# Patient Record
Sex: FEMALE | Race: WHITE | NOT HISPANIC OR LATINO | Employment: FULL TIME | URBAN - METROPOLITAN AREA
[De-identification: names, ages, dates, MRNs, and addresses within clinical notes are randomized per-mention and may not be internally consistent; named-entity substitution may affect disease eponyms.]

---

## 2017-08-24 ENCOUNTER — OFFICE VISIT (OUTPATIENT)
Dept: ORTHOPEDICS | Facility: CLINIC | Age: 77
End: 2017-08-24
Payer: COMMERCIAL

## 2017-08-24 ENCOUNTER — HOSPITAL ENCOUNTER (OUTPATIENT)
Dept: RADIOLOGY | Facility: HOSPITAL | Age: 77
Discharge: HOME | End: 2017-08-24
Attending: ORTHOPAEDIC SURGERY
Payer: MEDICARE

## 2017-08-24 VITALS — BODY MASS INDEX: 19.76 KG/M2 | WEIGHT: 133.38 LBS | HEIGHT: 69 IN

## 2017-08-24 DIAGNOSIS — G89.29 CHRONIC PAIN OF BOTH KNEES: Primary | ICD-10-CM

## 2017-08-24 DIAGNOSIS — M25.561 CHRONIC PAIN OF BOTH KNEES: ICD-10-CM

## 2017-08-24 DIAGNOSIS — M25.562 CHRONIC PAIN OF BOTH KNEES: Primary | ICD-10-CM

## 2017-08-24 DIAGNOSIS — M17.0 PRIMARY OSTEOARTHRITIS OF BOTH KNEES: ICD-10-CM

## 2017-08-24 DIAGNOSIS — M25.562 CHRONIC PAIN OF BOTH KNEES: ICD-10-CM

## 2017-08-24 DIAGNOSIS — M25.561 CHRONIC PAIN OF BOTH KNEES: Primary | ICD-10-CM

## 2017-08-24 DIAGNOSIS — G89.29 CHRONIC PAIN OF BOTH KNEES: ICD-10-CM

## 2017-08-24 PROCEDURE — 1125F AMNT PAIN NOTED PAIN PRSNT: CPT | Mod: ,,, | Performed by: ORTHOPAEDIC SURGERY

## 2017-08-24 PROCEDURE — 73562 X-RAY EXAM OF KNEE 3: CPT | Mod: TC,50

## 2017-08-24 PROCEDURE — 73562 X-RAY EXAM OF KNEE 3: CPT | Mod: 26,50,, | Performed by: RADIOLOGY

## 2017-08-24 PROCEDURE — 99999 PR PBB SHADOW E&M-NEW PATIENT-LVL II: CPT | Mod: PBBFAC,,, | Performed by: ORTHOPAEDIC SURGERY

## 2017-08-24 PROCEDURE — 99204 OFFICE O/P NEW MOD 45 MIN: CPT | Mod: 25,S$PBB,, | Performed by: ORTHOPAEDIC SURGERY

## 2017-08-24 PROCEDURE — 1159F MED LIST DOCD IN RCRD: CPT | Mod: ,,, | Performed by: ORTHOPAEDIC SURGERY

## 2017-08-24 RX ORDER — LEVOTHYROXINE SODIUM 50 UG/1
50 TABLET ORAL DAILY
COMMUNITY
End: 2023-04-10 | Stop reason: CLARIF

## 2017-08-24 RX ORDER — TRIAMCINOLONE ACETONIDE 40 MG/ML
40 INJECTION, SUSPENSION INTRA-ARTICULAR; INTRAMUSCULAR
Status: DISCONTINUED | OUTPATIENT
Start: 2017-08-24 | End: 2017-08-24 | Stop reason: HOSPADM

## 2017-08-24 RX ADMIN — TRIAMCINOLONE ACETONIDE 40 MG: 40 INJECTION, SUSPENSION INTRA-ARTICULAR; INTRAMUSCULAR at 11:08

## 2017-08-24 NOTE — PROCEDURES
Large Joint Aspiration/Injection  Date/Time: 8/24/2017 11:14 AM  Performed by: OCHSNER, JOHN L. JR  Authorized by: OCHSNER, JOHN L. JR     Consent Done?:  Yes (Verbal)  Indications:  Pain  Procedure site marked: Yes    Timeout: Prior to procedure the correct patient, procedure, and site was verified      Location:  Knee  Site:  R knee  Prep: Patient was prepped and draped in usual sterile fashion    Ultrasonic Guidance for needle placement: No  Needle size:  22 G  Approach:  Anterolateral  Medications:  40 mg triamcinolone acetonide 40 mg/mL  Patient tolerance:  Patient tolerated the procedure well with no immediate complications

## 2017-08-24 NOTE — PROGRESS NOTES
"CC:   bilateral knee pain  HPI:  77 y.o. yo female who presents with bilateral knee pain since 2017. States that there was no injury but one bay both oc her knees starting hurting. The pain is dull, constant and worst over the anterior knee. The pain has been progressive over the past month which is what brought her in today. She has tried oral anti inflammatories with moderate relief but has not had any injections into her knees. Denies locking, catching, swelling or erythema. No fevers or chills. Significantly she is leaving the country in a few days for 4 months in [place].     PAST MEDICAL HISTORY:   Past Medical History:   Diagnosis Date    H/O eye surgery     at age 8 (two done)    History of appendectomy     age 34 or 36    Hypothyroidism      PAST SURGICAL HISTORY:   Past Surgical History:   Procedure Laterality Date     SECTION      age 41     FAMILY HISTORY: History reviewed. No pertinent family history.  SOCIAL HISTORY:   Social History     Social History    Marital status:      Spouse name: N/A    Number of children: N/A    Years of education: N/A     Occupational History    Not on file.     Social History Main Topics    Smoking status: Former Smoker     Types: Cigarettes     Quit date: 1968    Smokeless tobacco: Never Used    Alcohol use Yes      Comment: socially (champagne)    Drug use: No    Sexual activity: Not on file     Other Topics Concern    Not on file     Social History Narrative    No narrative on file       MEDICATIONS:   Current Outpatient Prescriptions:     Lactobacillus rhamnosus GG (CULTURELLE) 10 billion cell capsule, Take 1 capsule by mouth once daily., Disp: , Rfl:     levothyroxine (SYNTHROID) 50 MCG tablet, Take 50 mcg by mouth once daily., Disp: , Rfl:   ALLERGIES: Review of patient's allergies indicates:  No Known Allergies    VITAL SIGNS: Ht 5' 8.5" (1.74 m)   Wt 60.5 kg (133 lb 6.1 oz)   BMI 19.99 kg/m²      Review of Systems "   Constitution: Negative. Negative for chills, fever and night sweats.   HENT: Negative for congestion and headaches.    Eyes: Negative for blurred vision, left vision loss and right vision loss.   Cardiovascular: Negative for chest pain and syncope.   Respiratory: Negative for cough and shortness of breath.    Endocrine: Negative for polydipsia, polyphagia and polyuria.   Hematologic/Lymphatic: Negative for bleeding problem. Does not bruise/bleed easily.   Skin: Negative for dry skin, itching and rash.   Musculoskeletal: Negative for falls and muscle weakness.   Gastrointestinal: Negative for abdominal pain and bowel incontinence.   Genitourinary: Negative for bladder incontinence and nocturia.   Neurological: Negative for disturbances in coordination, loss of balance and seizures.   Psychiatric/Behavioral: Negative for depression. The patient does not have insomnia.    Allergic/Immunologic: Negative for hives and persistent infections.       Physical Exam     General appearance    This is a well-developed, Well nourished patient No obvious acute distress.  Orientation: Patient is alert and oriented x4    Mood and affect: Normal, pleasant and conversant  Gait is coordinated. Patient can toe walk and heel walk without difficulty.    Cardiovascular: swelling or varicosities present.     Lymphatic: Negative for adenopathy     Deep Tendon Reflexes are normal; negative babinki  Coordination and Balance: Normal   Musculoskeletal     Back    ROM shows normal flexion, extension and rotation    Palpation shows no masses    Stability is normal    Strength to flexion and extension well maintained     Core strength is diminished    Skin shows no rashes or cafe au lait spots    Sensation intact to light touch     Left Lower Extremity    Alignment: wnl    ROM 0-105    Palpation shows no masses; erythema or swelling    Tenderness: TTP over medial joint line and patella. Patellofemoral crepitus    Hip ROM: No pain with forced  "internal rotation     Flexion:90     Internal Rotation:30     External Rotation:40    Knee stability: Stable to varus and valgus force. acl intact    Skin shows no rashes, lesions or cafe au lait spots    Sensation intact to light touch     Right Lower Extremity     Alignment: wnl    ROM 0-110    Palpation shows no masses, no erythema or swelling    Tenderness: TTP over patella. Some pain when stressing the medial joint line.    Hip ROM: No pain with forced internal rotation     Flexion:90     Internal Rotation:30     External Rotation:40    Knee stability: Stable to varus and valgus force, acl intact    Skin shows no rashes, lesions or cafe au lait spots    Sensation intact to light touch        Xrays:  X-rays of the bilateral knees are ordered and reviewed and my interpretation is as follows: Mild osteoarthritis in bilateral knees worst in the medial compartment. There is subchondral sclerosis but minimal joint space narrowing.    Assessment:  Encounter Diagnoses   Name Primary?    Chronic pain of both knees Yes       Plan:  - Patient would like to proceed with conservative management at this time.  - CSI into the right knee at this visit.  - Oral NSAID prn  - Continue with ICE, rest  - f/u prn  Right knee was injected.    Orders Placed This Encounter    X-ray Knee Ortho Bilateral               No results found for: HGBA1C  Estimated body mass index is 19.99 kg/m² as calculated from the following:    Height as of this encounter: 5' 8.5" (1.74 m).    Weight as of this encounter: 60.5 kg (133 lb 6.1 oz).          "

## 2018-03-01 ENCOUNTER — OFFICE VISIT (OUTPATIENT)
Dept: ORTHOPEDICS | Facility: CLINIC | Age: 78
End: 2018-03-01
Payer: MEDICARE

## 2018-03-01 VITALS — WEIGHT: 126 LBS | BODY MASS INDEX: 18.66 KG/M2 | HEIGHT: 69 IN

## 2018-03-01 DIAGNOSIS — M17.11 PRIMARY OSTEOARTHRITIS OF RIGHT KNEE: Primary | ICD-10-CM

## 2018-03-01 PROCEDURE — 99212 OFFICE O/P EST SF 10 MIN: CPT | Mod: PBBFAC | Performed by: ORTHOPAEDIC SURGERY

## 2018-03-01 PROCEDURE — 99213 OFFICE O/P EST LOW 20 MIN: CPT | Mod: S$PBB,,, | Performed by: ORTHOPAEDIC SURGERY

## 2018-03-01 PROCEDURE — 99999 PR PBB SHADOW E&M-EST. PATIENT-LVL II: CPT | Mod: PBBFAC,,, | Performed by: ORTHOPAEDIC SURGERY

## 2018-03-01 RX ORDER — TRETINOIN 0.5 MG/G
CREAM TOPICAL
Refills: 4 | COMMUNITY
Start: 2018-02-23 | End: 2023-04-11

## 2018-03-01 NOTE — PROGRESS NOTES
"HPI:    Qiana Nettles is a 78 y.o. female who is here today for   Chief Complaint   Patient presents with    Left Knee - Pain    Right Knee - Pain   .     Duration: 6 months  Intensity: severe  Character of pain: sharp  Location: She reports that the pain is predominately  lateral    Past Medical History:   Diagnosis Date    H/O eye surgery     at age 8 (two done)    History of appendectomy     age 34 or 36    History of removal of ovarian cyst     Hypothyroidism           Current Outpatient Prescriptions:     levothyroxine (SYNTHROID) 50 MCG tablet, Take 50 mcg by mouth once daily., Disp: , Rfl:     tretinoin (RETIN-A) 0.05 % cream, APPLY TO AFFECTED AREA AS DIRECTED, Disp: , Rfl: 4     Review of patient's allergies indicates:  No Known Allergies     ROS      Physical Exam:   Ht 5' 8.5" (1.74 m)   Wt 57.2 kg (126 lb)   BMI 18.88 kg/m²   General appearance: This is a well-developed, Well nourished female No obvious acute distress.  Psychiatric: normal mood and affect;  pleasant and conversant; judgment and thought content normal  Gait is coordinated. Patient has antalgic gait to the right  Cardiovascular: There are no swelling or varicosities present.   Respiratory: normal respiratory effort   Lymphatic: no adenopathy   Neurologic: alert and oriented to person, place, and time   Deep Tendon Reflexes are normal;  Coordination and Balance: Normal   Musculoskeletal   Neck    ROM shows normal flexion and extension and lateral rotation    Palpation: Non-tender    Stability is normal    Strength is normal    Skin is normal without masses and lesions    Sensation is intact to light touch   Back    ROM showsabnormal flexion, extension    and  rotation    Palpation shows no masses    Stability is normal    Strength to flexion and extension well maintained    Core strength is diminished    Skin shows no rashes or cafe au lait spots;     Sensation is intact to light touch    Right Knee  Swelling " Mild  TendernessLateral joint line  Range of Motion: 120    Left Knee: Swelling None  TendernessMedial joint line  Range of Motion: 120    Radiograph   Osteoarthritis: moderate  Angle: significant valgus, I think the patient is starting to wear the lateral tibial plateau.    Assessment: Osteoarthritis both knees.    Plan:  No treatment at this time.

## 2020-11-20 ENCOUNTER — RESEARCH ENCOUNTER (OUTPATIENT)
Dept: RESEARCH | Facility: CLINIC | Age: 80
End: 2020-11-20
Payer: MEDICARE

## 2020-11-20 VITALS
TEMPERATURE: 99 F | RESPIRATION RATE: 16 BRPM | BODY MASS INDEX: 19.72 KG/M2 | DIASTOLIC BLOOD PRESSURE: 71 MMHG | SYSTOLIC BLOOD PRESSURE: 121 MMHG | HEIGHT: 65 IN | WEIGHT: 118.38 LBS | HEART RATE: 62 BPM | OXYGEN SATURATION: 99 %

## 2020-11-20 DIAGNOSIS — Z23 NEED FOR VACCINATION: Primary | ICD-10-CM

## 2020-11-20 PROCEDURE — 0031A COVID-19,VECTOR-NR,RS-AD26,PF,0.5 ML DOSE VACCINE (JANSSEN): CPT | Mod: CV19,,, | Performed by: CLINICAL NURSE SPECIALIST

## 2020-11-20 PROCEDURE — 91303 COVID-19,VECTOR-NR,RS-AD26,PF,0.5 ML DOSE VACCINE (JANSSEN): CPT | Mod: ,,, | Performed by: CLINICAL NURSE SPECIALIST

## 2020-11-20 PROCEDURE — 91303 COVID-19,VECTOR-NR,RS-AD26,PF,0.5 ML DOSE VACCINE (JANSSEN): ICD-10-PCS | Mod: ,,, | Performed by: CLINICAL NURSE SPECIALIST

## 2020-11-20 PROCEDURE — 99499 UNLISTED E&M SERVICE: CPT | Mod: S$PBB,,, | Performed by: INTERNAL MEDICINE

## 2020-11-20 PROCEDURE — 99499 NO LOS: ICD-10-PCS | Mod: S$PBB,,, | Performed by: INTERNAL MEDICINE

## 2020-11-20 PROCEDURE — 0031A COVID-19,VECTOR-NR,RS-AD26,PF,0.5 ML DOSE VACCINE (JANSSEN): ICD-10-PCS | Mod: CV19,,, | Performed by: CLINICAL NURSE SPECIALIST

## 2020-11-20 NOTE — PROGRESS NOTES
Screening/Vaccination Visit (Day 1)   Sponsor: [employer] [facility]   Study: A Randomized, Double-blind, Placebo-controlled Phase 3 Study to Assess the Efficacy and Safety of Ad26.COV2.S for the Prevention of SARS-CoV-2-mediated COVID-19 in Adults Aged 18 Years and Older   IRB/Protocol #: 2020.275/ [id number]; Phase 3?   Principle Investigator/Sub-Investigator: MARS Ramírez  Site Number: [id number]  Location: [facility]  Subject Number: [id number]    Did the Day 1 Visit Occur at Screening visit?: Yes  Type of Contact: Site Visit     Informed Consent:   Consenting was completed in private area using the most current IRB approved version of the ICF by myself and patient was given ample time to review consent prior to execution of ICF.    Prior to the Informed Consent (IC) being signed, or any study protocol required data collection, testing, procedure, or intervention being performed, the following was done and/or discussed:?    Patient was given a copy of the IC for review??    Purpose of the study and qualifications to participate??    Study design, follow up schedule, and tests or procedures done at each visit?    Confidentiality and HIPAA Authorization for Release of Medical Records for the research trial/ subject's rights/research related injury?    Risk, Benefits, Alternative Treatments, Compensation and Costs?    Participation in the research trial is voluntary and patient may withdraw at anytime?    Contact information for study related questions?     Patient verbalizes understanding of the above: Yes?   Contact information for CRC and PI given to patient: Yes?   Patient able to adequately summarize: the purpose of the study, the risks associated with the study, and all procedures, testing, and follow-ups associated with the study: Yes?     Qiana Nettles signed the IRB approved version of informed consent form for the [employer] [facility] research study.? Each page of the  consent was reviewed with the patient and patient's family) and all questions answered satisfactorily. The patient signed the paper consent form and received a copy of same (copy of informed consent made and provided to patient). The original consent was scanned into electronic medical records (EPIC).    Time of Consent Execution: 13:40pm    I have provided the participant with a thermometer, pulse-oximeter, MA-COV form, nasal swab kit, and saliva recipients. Participant has been trained and educated on use of these study materials. Patient reports understanding on how/when to use study materials.    Vital Signs:  Vital signs were performed prior to blood draws and preceded by 5 or more minutes of rest in a quiet setting without distractions.  Were vital signs performed?: yes  Vitals obtained by: Javed Vargas  Date of collection: 20Nov2020  Time of collection: 13:50pm  Position: Sitting  Pulse: 62 beats/min  Systolic BP: 121 mmHg  Diastolic BP: 71 mmHg  Respiratory Rate: 16 breaths/min  All vitals were within normal limits.    Body Temperature:  Was body temperature collected?: yes  Collected by: Javed Vargas  Date of collection: 20-NOV-2020  Time of collection: 13:50pm  Temperature: 98.7 F  Temperature Anatomical Location: Oral    Oxygen Saturation:  Was Oxygen Saturation collected?: yes  Collected by: Javed Vargas  Date of collection: 20-NOV-2020  Time of collection: 13:50pm  Oxygen Saturation: 99 %  Subject's condition of oxygen saturation measurement: room air  Oxygen Saturation is within normal limits.    Body Weight and Height:  Was body weight and height collected?: yes  Collected by: Javed Vargas  Date of collection: 20-NOV-2020  Height: 165 cm  Weight: 53.7 kg  BMI: 19.72 kg/m2    Pregnancy Test:  Was the sample collected?: N/A  Sample collected by: Javed Vargas  Date of collection:   Time of collection:   Specimen Type: Urine  Pregnancy Test Result:     Inclusion/Exclusion  Criteria:  Inclusion Criteria (all must be YES to qualify):  1. Criterion modified per Amendment 1:  1.1 Participant must provide consent indicating that he or she understands the purpose, procedures and potential risks and benefits of the study, and is willing to participate in the study.   yes  2. Participant is willing and able to adhere to the prohibitions and restrictions specified in this protocol.   yes  3. Stages 1a and 1b: Participant is ?18 to <60 years of age on the day of signing the ICF.   N/A  Stages 2a and 2b: Participant is ?60 years of age on the day of signing the ICF.  Yes  4. Criterion modified per Amendment 1:  4.1 Criterion modified per Amendment 2:  4.2 Stages 1a and 2a: In the investigator's clinical judgement, participant must be either in good or stable health, including a BMI <30 kg/m2.     Participants may have underlying illnesses (not associated with increased risk of progression to severe COVID-19, as specified in Exclusion Criterion 15), as long as their symptoms and signs are stable and well-controlled. If participants are on medication for a condition not part of the comorbidities listed in Exclusion Criterion 15, the medication dose cannot have been increased within 12 weeks preceding vaccination and expected to remain stable for the duration of the study. Participants will be included on the basis of relevant medical history and BMI measurement at screening.   N/A    Stages 1b and 2b: In the investigator's clinical judgement, participant may have a stable and well-controlled comorbidity associated with an increased risk of progression to severe COVID-19 as specified in Exclusion Criterion 15 (eg, stable/well-controlled HIV infection)*. If participants are on medication for a comorbidity associated with an increased risk of progression to severe COVID-19, the medication dose cannot have been increased within 12 weeks preceding vaccination and must be expected to remain stable for  the duration of the study. Participants will be included on the basis of relevant medical history and BMI measurement at screening.   * Stable/well-controlled HIV infection includes:  a. CD4 cell count ?300 cells/?L.  b. HIV viral load <50 copies/mL.  c. Participant must be on a stable anti-retroviral treatment (ART) for 6 months (unless the change is due to tolerability, in which case the regimen can be for only the previous 3 months; changes in formulation are allowed) and the participant must be willing to continue his/her ART throughout the study as directed by his/her local physician.    NOTE: Participants with ongoing and progressive comorbidities associated with HIV infection will be excluded but comorbidities associated with HIV infection that have been clinically stable for the past 6 months are not an exclusion criterion.     Laboratory methods for confirming a diagnosis of HIV infection are: Any evidence (historic or current) from medical records, such as JANELLE with confirmation with Western Blot or PCR, or of a detectable viral load (country-specific regulatory approved tests). A laboratory result within 6 months of screening does not need to be repeated.     If a potential participant does not have HIV viral load and CD4 cell count data in his/her medical records, they will be instructed to go to their local health care provider and obtain the necessary data for potential entry into the trial.   Yes  5. Criterion modified per Amendment 1:  5.1 Contraceptive (birth control) use should be consistent with local regulations regarding the acceptable methods of contraception for those participating in clinical studies.  Before randomization, participants must be either  a. Not of childbearing potential  b. Of childbearing potential and practicing an acceptable effective method of contraception and agrees to remain on such a method of contraception from providing consent until 3 months after administration of  study vaccine. Use of hormonal contraception should start at least 28 days before the administration of study vaccine. The investigator should evaluate the potential for contraceptive method failure (eg, noncompliance, recently initiated) in relationship to the vaccination. Acceptable effective methods for this study include:  1. hormonal contraception:  i. combined (estrogen and progestogen containing) hormonal contraception associated with inhibition of ovulation (oral, intravaginal, or transdermal)  ii. progestogen-only hormonal contraception associated with inhibition of ovulation (oral, injectable, or implantable)  2. intrauterine device;  3. intrauterine hormone-releasing system;  4. bilateral tubal occlusion/ligation procedure;  5. vasectomized partner (the vasectomized partner should be the sole partner for that participant);  6. sexual abstinence*.  *Sexual abstinence is considered an effective method only if defined as refraining from heterosexual intercourse from providing consent until 3 months after receiving study vaccine. The reliability of sexual abstinence needs to be evaluated in relation to the duration of the study and the preferred and usual lifestyle of the participant.  yes  6. All participants of childbearing potential must:  d. Have a negative highly sensitive urine pregnancy test at screening  e. Have a negative highly sensitive urine pregnancy test on the day of and prior to study vaccine administration.  N/A  7. Participant agrees to not donate bone marrow, blood, and blood products from the study vaccine administration until 3 months after receiving the study vaccine.  yes  8. Must be willing to provide verifiable identification, has means to be contacted and to contact the investigator during the study.    Must be able to read, understand, and complete questionnaires in the eCOA (ie, the COVID-19 signs and symptoms surveillance question, the e-Diary, and the electronic patient-reported  outcomes (ePROs).  yes    Exclusion Criteria (all must be NO to qualify):  1. Participant has a clinically significant acute illness (this does not include minor illnesses such as diarrhea or mild upper respiratory tract infection) or temperature ?38.0ºC (100.4°F) within 24 hours prior to the planned study vaccination; randomization at a later date is permitted at the discretion of the investigator and after consultation with the sponsor.  no  2. Participant has a known or suspected allergy or history of anaphylaxis or other serious adverse reactions to vaccines or their excipients (including specifically the excipients of the study vaccine; refer to the IB).  no  3. Criterion modified per Amendment 1:  3.1 Criterion modified per Amendment 2:  3.2 Participant has abnormal function of the immune system resulting from:  a. Clinical conditions (eg, autoimmune disease or potential immune mediated disease or known or suspected immunodeficiency) expected to have an impact on the immune response of the study vaccine. Participants with clinical conditions stable under non-immunomodulator treatment (eg, autoimmune thyroiditis, autoimmune inflammatory rheumatic disease such as rheumatoid arthritis) may be enrolled at the discretion of the investigator. Non-immunomodulator treatment is allowed as well as steroids at a non-immunosuppressive dose or route of administration.  b. Chronic or recurrent use of systemic corticosteroids within 6 months before administration of study vaccine and during the study. A substantially immunosuppressive steroid dose is considered to be ?2 weeks of daily receipt of 20 mg of prednisone or equivalent.  Note: Ocular, topical or inhaled steroids are allowed.  c. Administration of antineoplastic and immunomodulating agents or radiotherapy within 6 months before administration of study vaccine and during the study.  no  4. Participant received treatment with Ig in the 3 months or blood products in  the 4 months before the planned administration of the study vaccine or has any plans to receive such treatment during the study.  no  5. Participant received or plans to receive:  f. Licensed live attenuated vaccines - within 28 days before or after planned administration of study vaccine.  g. Other licensed (not live) vaccines - within 14 days before or after planned administration of study vaccine.  no  6. Participant previously received a coronavirus vaccine.  no  7. Criterion modified per Amendment 1:  7.1 Criterion modified per Amendment 2:  7.2 Participant received an investigational drug (including investigational drugs for prophylaxis of COVID-19) or used an invasive investigational medical device within 30 days or received investigational immunoglobulin or monoclonal antibodies within 3 months, or received convalescent serum for COVID-19 treatment within 4 months or received an investigational vaccine (including investigational Adenoviral-vectored vaccines) within 6 months before the planned administration of the study vaccine or is currently enrolled or plans to participate in another investigational study during the course of this study. See also Section 6.8.    Note: Participation in an observational clinical study is allowed at the investigator's discretion; please notify the sponsor (or medical monitor) of this decision.    Efforts will be made to ensure inclusion of participants who have not been previously enrolled in coronavirus studies and to prevent participants from subsequently enrolling in other coronavirus studies during their participation in this study.     The use of any coronavirus vaccine (licensed or investigational) other than Ad26.COV2.S is disallowed at any time prior to vaccination (see also Exclusion Criterion 6) and during the study, except under the conditions described in Section 6.6.  no  8. Criterion modified per Amendment 1:   8.1 Participant is pregnant or planning to become  pregnant within 3 months after study vaccine administration.   No  9. Participant has a history of an underlying clinically significant acute or chronic medical condition or physical examination findings for which, in the opinion of the investigator, participation would not be in the best interest of the participant (eg, compromise the wellbeing) or that could prevent, limit, or confound the protocol-specified assessments.  no  10. Participant has a contraindication to IM injections and blood draws, eg, bleeding disorders.  no  11. Criterion deleted per Amendment 1:  12. Criterion modified per Amendment 1:  12.1 Participant has had major psychiatric illness which in the investigator's opinion would compromise the participant's safety or compliance with the study procedures.  no  13. Participant cannot communicate reliably with the investigator.  no  14. Participant who, in the opinion of the investigator, is unlikely to adhere to the requirements of the study, or is unlikely to complete the full course of vaccination and observation.  no  15. Criterion modified per Amendment 1:  15.1 Criterion modified per Amendment 2:   15.2 Stages 1a and 2a:    Participants with comorbidities that are or might be associated with an increased risk of progression to severe COVID-19, ie, participants with moderate to severe asthma; chronic lung diseases such as chronic obstructive pulmonary disease (COPD) (including emphysema and chronic bronchitis), idiopathic pulmonary fibrosis and cystic fibrosis; diabetes (including type 1 or type 2); serious heart conditions, including heart failure, coronary artery disease, congenital heart disease, cardiomyopathies, and pulmonary hypertension; moderate to severe high blood pressure; obesity (body mass index [BMI] ?30 kg/m2); chronic liver disease, including cirrhosis; sickle cell disease; thalassemia; cerebrovascular disease; neurologic conditions (dementia); end stage renal disease; organ  transplantation; cancer; HIV infection and other immunodeficiencies; hepatitis B infection; sleep apnea.   Participants with a history of or current Parkinson's disease; seizures; ischemic strokes; intracranial hemorrhage; encephalopathy and meningoencephalitis.  N/A  16. Stages 1a and 2a: Participant has a history of malignancy within 1 year before screening (exceptions are squamous and basal cell carcinomas of the skin and carcinoma in situ of the cervix, or other malignancies with minimal risk of recurrence).  N/A  17. Criterion Modified per Amendment 2:  17.1 Participant has a history of acute polyneuropathy (eg, Guillain-Barré syndrome).  no  18. Stages 1a and 2a: Participant had surgery requiring hospitalization (defined as inpatient stay for longer than 24 hours or overnight stay), within 12 weeks before vaccination, or will not have fully recovered from surgery requiring hospitalization, or has surgery requiring hospitalization planned during the time the participant is expected to participate in the study or within 6 months after study vaccine administration.  N/A  19. Stages 1a and 2a: Participant has chronic active hepatitis B or hepatitis C infection per medical history.  N/A      Were all eligibility criteria met?: yes  If no, enter information about main criteria not satisfied/met: N/A    I have discussed with the patient requirement of acceptable methods of contraception for those participating in this study. Their method of contraceptive is Participant is 80 years of age.  Post Menopausal    I have discussed with the patient the lifestyle considerations they must be willing and able to adhere to during the study, including: prohibited and restricted therapy during the study, agreement to follow all requirements that must be met during the study as noted in the inclusion and exclusion criteria (eg, contraceptive requirements), and agreement to follow requirements for the electronic completion of the  COVID-19 signs and symptoms surveillance questions in the electronic clinical outcome assessment (eCOA). They have agreed to these terms and will continue in the study.    Inclusion/Exclusion were reviewed with the patient by myself and confirmed by PI/Sub-MARS Billingsley.      Demographic:  What was the date of the signature on informed consent?: 20-NOV-2020  What is the protocol date the subject consented to at study start?: 79  Age: 80 y.o.  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    Has the subject experience any past and/ or concomitant diseases?: no    Preplanned Surgeries/Procedures:   Are there any planned/scheduled surgeries/procedures which require hospitalization during the course of this trial?: no    Pre-Study Therapy:  Does patient have any pre-study therapies?: yes    Prestudy therapies only collected for participants with relevant comorbidities and aged ?60 years. For these participants, all prestudy therapies (excluding vitamins, herbal supplements; non-pharmacologic therapies such as electrical stimulation, acupuncture, special diets, and exercise regimen) administered up to 30 days before vaccination must be recorded at screening.    Pre-Study Therapy Log Below:  1.  Medication or Therapy Name: Tretinoin   Indication: Other: Skin Condition   Dose: 0.05 %   Dose unit: Unit  Dose Form: Cream  Route: Topical  Frequency: PRN  Start Date: UNKNOWN 2018  If Start Date is entirely or partially unknown or  equal to the vaccination date:    Was the medication/therapy taken prior to vaccination?: Yes  Ongoing?: yes  End Date: N/A      Pre-Study Therapy:  Does patient have any pre-study therapies?: no    Prestudy therapies only collected for participants with relevant comorbidities and aged ?60 years. For these participants, all prestudy therapies (excluding vitamins, herbal supplements; non-pharmacologic therapies such as electrical stimulation, acupuncture, special diets, and exercise regimen) administered up to 30 days before vaccination must be recorded at screening.    U Questionnaire:  1. Medical Consultations:  In the last 3 months, how many times have you had medical consultations?      No Yes Type of contact (personal consultation/telemedicine If yes, specify the number of visits Indicate a reason for each visit   General Practitioner/Nurse  Practitioner X       Internal Medicine/Medical Outpatient Department X       Other Specialist (Please specify):  X       Other (e.g., Physiotherapy, Pharmacist for a consultation; Please specify):  X         2. Professional home care:  Please indicate the need for professional care at home in the last 3 months.      No Yes Type of contact (personal consultation/telemedicine If yes, specify the number of visits Indicate a reason for each visit   General Practitioner X       Nurse/ Nurse Practitioner X       Internal Medicine/Medical Outpatient Department X       Other Specialist (Please specify):  X       Other (e.g., Physiotherapy, Pharmacist for a consultation; Please specify):  X       Supplemental oxygen X         3. Hospital Services:   In the last 3 months, did you visit the hospital?: no      No Yes If yes, specify the number of visits/admission If yes, specify length of each stay/use (days) Indicate a reason for each hospital visit   Emergency Department X       Short-term hospital visit (<24 hours admission) X         Hospitalization in general lawler# X        Hospitalization in intensive/critical care X       Mechanical ventilation use X          *Please count Emergency Department visits only if the visit did not result in a hospital admission.  #Please capture type of lawler and length of stay in each lawler.    4. Institutional care admission(s) other than hospital:   Has there been any need for admission for care in a long-term facility, in the last 3 months?: no     No Yes If yes, specify the number of admissions If yes, specify length of each stay (days) Indicate a reason for each institutional care admission   Long-term facilities   X       Rehabilitation facility   X       Supplemental oxygen   X           Baseline and Longitudinal Characteristics for Risk Factor Analysis:  Are you a student?: no  If Yes - Are you likely to return to school in person in 2020?: N/A  Are you retired?: no  How often do you go in person to your main workplace (other than work-from-home)?: N/A  Does your main workplace have social distancing measures in place?: N/A  Is your main workplace cleaned on a regular basis?: N/A  Do people in your main workplace use personal protection equipment (such as masks)?: N/A  How do you get to work?: N/A  On a typical day, how many people do you interact with in person at work?: N/A  On a typical day, how many people do you interact with in person outside of work?: No one    Living Situation:  Do you live in any of the following?: Single family home  How many people do you live with (other than yourself?: 0   Total people under 18 years of age: N/a   Total people between 18-64 years of age: N/a   Total people over 65 years of age: N/a  Are any of the people you live with expected to return to school in person in 2020?: N/A    Community Interactions:  In the last 2 weeks, have you attended any gatherings with more than 10 people? (e.g., Restorationism, party, concert, wedding, , demonstration or other event).: No  If yes, approximately how many people  were at the largest gathering?: N/A  Was the gathering an indoor or outdoor event?: N/A  How frequently do you have visitors in your residence including people completing work inside?: Rarely  Over the past month, have you been in close contact with anyone that tested positive for COVID-19?: No  If yes, is this person someone that you live with?: Not applicable/Don't want to tell

## 2020-11-20 NOTE — PROGRESS NOTES
[id number] - [id number]   Status Consented - In Screening   Start Date 11/20/20   Coordinator Estee Montague; Ashleigh Hale; Rosalie Olivas; Yvonne Ferrell; Meli Rubio; Fernie Khan; Edith Gibbons; Virgen May; Sandra James; Verónica Reaves; Minh Yanez; Javed Vargas; Eloisa Kraft; Patti Morales; Robel Caldwell; Ally Woodard; Eveline Landers; Rossy Jacobs; Mckay Talavera; Yane Vang; Latoya Frost; Cici Pierre; Khushi Garcia; Ramona Moore; Luz Wadsworth; Halie Clayton; Yvonne Ferrell; Edith Gibbons; Shara Llanes       Randomization:  Date of randomization: 20Nov2020  Time of randomization: 14:23  Randomization done by: Minh Yanez    Subset Selection:  Is the subject enrolled to the Immunogenicity Subset?: no  Is the subject enrolled to the Safety Subset?: no    Nasal Swab:  Was the sample collected?: yes  Sample collected by: Ramona Moore  Date of collection: 20Nov2020  Time of collection: 15:04  Method of SARS-CoV-2 detection: Molecular Diagnostic Test  Method of Collection: Nasal Mid-turbinate Swab  Was the sample taken from both nostrils?: yes  Accession Number: [id number]    SARS-CoV-2 Serology:  Was the sample collected?: No -- Not required at this site per protocol  Sample collected by: N/A  Date of collection: N/A  Time of collection: N/A  Method of SARS-CoV-2 detection: Serological test for SARS-CoV-2-specific antibodies  SARS-CoV-2 Ig Type Tested: N/A  mL collected: N/A  Accession Number: N/A    Blood Sample Collection for Biomarker (RNA seq):  Was the sample collected?: yes  Sample collected by: Ramona Moore  Date of collection: 20Nov2020  Time of collection: 15:14  Specimen Type: whole blood  mL collected: 2.5  Accession Number: [id number]    Blood Sample Collection for Humoral Immunogenicity:   Was the sample collected?: yes  Sample collected by: Ramona Moore  Date of collection: 20Nov2020  Time  of collection: 15:14  Specimen Type: Serum  mL collected: 10 (NON-immuno subset)  Accession Number: [id number]    At this time, the patient has downloaded the eCOA application onto their eDevice/ or cellular device. The Participant has been trained and educated on the eCOA and express understanding on how to use this application. They understand requirement to complete/conduct all eCOA assessments prior to to any tests, procedures, or other consultations. Participant also understands that if they are unable to complete the eCOA independently, a study staff member or caretaker can collect the information on their behalf.    SIC Questionnaire:   Patient has completed all SIC questionnaires in the eCOA application, including body temperature measured by patient themselves    Participant has been trained and educated on use of MA-COV form, thermometer, pulse-oximeter, nasal swab kit, and saliva recipients. Patient reports understanding on how/when to use study materials.    Pre-Vaccination Symptoms:  I have checked participant for acute illness (this does not include minor illnesses, such as diarrhea or mild upper respiratory tract infection), body temperature ?38.0°C/100.4°F, and any illness which may interfere with reactogenicity/Day 0-7 safety assessments per investigator. Patient has not had any of these events occur within 24 hours prior to the planned vaccination.

## 2020-11-20 NOTE — PROGRESS NOTES
Study Drug Administration:  Was the dose administered?: yes  Administered by: BALTAZAR Diego, RN  Date administered:20NOV2020  Time administered: 15:38  Was the total amount administered?: yes  Dose: 0.5 mL  Formulation: Injectable  Route: Intramuscular  Anatomical location of the administration: upper left arm     Post-Vaccination Observation:  Patient has been closely observed for at least 15 minutes post-vaccination to monitor for the development of acute reactions.  Observation done by: BALTAZAR Diego, RN  IP Administration Time:15:38  Completed Observation Time: 15:53  Is patient in safety subset?: yes

## 2020-11-24 NOTE — PROGRESS NOTES
[id number] - [id number]   Status Consented   Start Date 11/20/20   Coordinator Estee Montague; Ashleigh Hale; Rosalie Olivas; Yvonne Ferrell; Meli Rubio; Fernie Khan; Edith Gibbons; Virgen May; Sandra James; Verónica Reaves; Minh Yanez; Javed Vargas; Eloisa Kraft; Patti Morales; Robel Caldwell; Ally Woodard; Eveline Landers; Rossy Jacobs; Mckay Talavera; Yane Vang; Latoya Frost; Cici Pierre; Khushi Garcia; Ramona Moore; Luz Wadsworth; Halie Clayton; Yvonne Ferrell; Edith Gibbons; Shara Llanes       End of Visit:  End of visit procedures completed by Javed Vargas. Patient has completed Screening/Vaccination visit (Day 1) and vaccination has been administered. Post-vaccination administration observation of at least 15 minutes has occurred. I have asked patient if they have experienced any Adverse Events during this visit, to which they have said no. Any disclosed AEs will be added to the log.    Visit 3 has been scheduled for 18-DEC-2020 at 12:30PM. I have expressed importance of returning on scheduling visit date and time.     The patient has a copy of the signed Informed Consent. ClinCard (Token #[id number] ) has been dispensed to the patient with instruction on how to use card. Patient is made aware that it can take up to 24 hrs before payment will be made available on card. [employer] Token number has been recorded in enrollment log.     Patient has been added to On[employer].    Patient has been sent home with a thermometer, pulse-oximeter, MA-COV form, nasal swab kit, and saliva recipients.   Patient has been advised to contact site if she experiences any major changes in health or has any study related questions.

## 2020-12-18 ENCOUNTER — RESEARCH ENCOUNTER (OUTPATIENT)
Dept: RESEARCH | Facility: CLINIC | Age: 80
End: 2020-12-18
Payer: MEDICARE

## 2020-12-18 NOTE — PROGRESS NOTES
Day 29 Visit 3  Sponsor: [employer]   Study: A Randomized, Double-blind, Placebo-controlled Phase 3 Study to Assess the Efficacy and Safety of Ad26.COV2.S for the Prevention of SARS-CoV-2-mediated COVID-19 in Adults Aged 18 Years and Older   IRB/Protocol #: 2020.275/ [id number]; Phase 3?   Principle Investigator/Sub-Investigator: MARS Ramírez  Site Number: [id number]  Location: [facility]  Subject Number: [id number]    Date of Visit:   Subject Status: Continuing  Visit Date: 12/18/2020  Type of Contact: Site Visit    Patient needs to be reconsented?: No    Concomitant Medications:  Has patient had a change in concomitant medications since Day 1 Visit?: no    Adverse Events:  Has patient experienced any adverse events since Day 1 Visit?: no    Body Temperature:  Was body temperature collected?: yes  Collected by: Eveline Landers  Date of collection: 12/18/2020  Time of collection: 12:39pm  Temperature: 98.4 F  Temperature Anatomical Location: Oral    End of Visit:  Check-out completed by Eveline Landers    Visit 4 has been scheduled for 29-JAN-2020 at 12:30PM. I have expressed importance of returning on scheduling visit date and time.      Patient has had funds added to their ClinCard and visit was updated in [employer].     Patient has been advised to contact site if she experiences any major changes in health or has any study related questions.

## 2020-12-18 NOTE — PROGRESS NOTES
Blood Sample Collection for Humoral Immunogenicity:   Was the sample collected?: yes  Sample collected by: Rossy Jacobs  Date of collection: [id number]  Time of collection: 12:55pm  Specimen Type: Serum  mL collected: 10  Accession Number: [id number]    Blood Sample Collection for Biomarker (RNA seq):  Was the sample collected?:  No - Other: Tube not provided by sponsor.

## 2021-01-06 ENCOUNTER — DOCUMENTATION ONLY (OUTPATIENT)
Dept: RESEARCH | Facility: CLINIC | Age: 81
End: 2021-01-06

## 2021-01-29 ENCOUNTER — RESEARCH ENCOUNTER (OUTPATIENT)
Dept: RESEARCH | Facility: CLINIC | Age: 81
End: 2021-01-29
Payer: MEDICARE

## 2021-05-14 ENCOUNTER — RESEARCH ENCOUNTER (OUTPATIENT)
Dept: RESEARCH | Facility: CLINIC | Age: 81
End: 2021-05-14
Payer: MEDICARE

## 2021-05-17 ENCOUNTER — PATIENT MESSAGE (OUTPATIENT)
Dept: ADMINISTRATIVE | Facility: OTHER | Age: 81
End: 2021-05-17

## 2021-07-29 ENCOUNTER — RESEARCH ENCOUNTER (OUTPATIENT)
Dept: RESEARCH | Facility: CLINIC | Age: 81
End: 2021-07-29
Payer: MEDICARE

## 2021-10-29 ENCOUNTER — RESEARCH ENCOUNTER (OUTPATIENT)
Dept: RESEARCH | Facility: CLINIC | Age: 81
End: 2021-10-29
Payer: MEDICARE

## 2022-04-26 ENCOUNTER — TELEPHONE (OUTPATIENT)
Dept: RESEARCH | Facility: HOSPITAL | Age: 82
End: 2022-04-26
Payer: MEDICARE

## 2022-04-26 NOTE — TELEPHONE ENCOUNTER
Sponsor: [employer]   Study: A Randomized, Double-blind, Placebo-controlled Phase 3 Study to Assess the Efficacy and Safety of Ad26.COV2.S for the Prevention of SARS-CoV-2-mediated COVID-19 in Adults Aged 18 Years and Older   IRB/Protocol #: 2020.275/ [id number]; Phase 3?   Principle Investigator/Sub-Investigator: Dr. Qamar Martin  Site Number: [id number]  Location: [facility]  Subject Number: [id number]    Subject's Month 18 Visit rescheduled to 6 May 2022 at 1230.

## 2022-05-06 ENCOUNTER — RESEARCH ENCOUNTER (OUTPATIENT)
Dept: RESEARCH | Facility: CLINIC | Age: 82
End: 2022-05-06
Payer: MEDICARE

## 2022-05-06 NOTE — PROGRESS NOTES
Jack Month 18  Sponsor: [employer]   Study: A Randomized, Double-blind, Placebo-controlled Phase 3 Study to Assess the Efficacy and Safety of Ad26.COV2.S for the Prevention of SARS-CoV-2-mediated COVID-19 in Adults Aged 18 Years and Older   IRB/Protocol #: 2020.275/ [id number]; Phase 3?   Principle Investigator/Sub-Investigator: MARS Ramírez  Site Number: [id number]  Location: [facility]  Subject Number: [id number]    Does subject need to be re consented at this time? yes     Informed Consent:   Is this a reconsent?: yes  Consenting was completed in private area using the most current IRB approved version of the ICF dated 23-FEB-2022  by myself and patient was given ample time to review consent prior to execution of ICF.    Prior to the Informed Consent (IC) being signed, or any study protocol required data collection, testing, procedure, or intervention being performed, the following was done and/or discussed:?    Patient was given a copy of the IC for review??    Purpose of the study and qualifications to participate??    Study design, follow up schedule, and tests or procedures done at each visit?    Confidentiality and HIPAA Authorization for Release of Medical Records for the research trial/ subject's rights/research related injury?    Risk, Benefits, Alternative Treatments, Compensation and Costs?    Participation in the research trial is voluntary and patient may withdraw at anytime?    Contact information for study related questions?     Patient verbalizes understanding of the above: Yes?   Contact information for CRC and PI given to patient: Yes?   Patient able to adequately summarize: the purpose of the study, the risks associated with the study, and all procedures, testing, and follow-ups associated with the study: Yes?     Qiana REMI Tubbsay signed the IRB approved version of informed consent form for the [employer] research study.? Each page of  the consent was reviewed with the patient and patient's family) and all questions answered satisfactorily. The patient signed the paper consent form and received a copy of same (copy of informed consent made and provided to patient). The original consent was scanned into electronic medical records (EPIC).    Concomitant Medications:  Has patient had a change in concomitant medications since prior Visit?: no      Adverse Events:  Has patient experienced any adverse events since prior Visit?: no      Has the subject required any medical encounters other than those mandated per trial protocol?: no      Start Date: 16-DEC-2021  End Date: 16-DEC-2021  Reason for medical encounter: Other: follow up   Type of medical encounter: Medical Practitioner Office: A consultation with the physician/ other medical practitioner at the office of the health care professional    If Hospital Outpatient Department, Laboratory Department, Medical Practitioner Office or Home Care is selected, specify frequency of visits: Once    If Hospital Outpatient Department is selected, specify type of practitioner: Urologist     Start Date: 17-FEB-2022  End Date: 17-FEB-2022  Reason for medical encounter: Other: FOLLOW UP   Type of medical encounter: Hospital Outpatient Department: a consultation in an outpatient hospital department without overnight stay    If Hospital Outpatient Department, Laboratory Department, Medical Practitioner Office or Home Care is selected, specify frequency of visits: Once    If Hospital Outpatient Department is selected, specify type of practitioner: X-Ray      Body Temperature:  Was body temperature collected?: yes  Collected by: Eveline Landers  Date of collection: 06-MAY-2022  Time of collection: 12:30PM  Temperature: 98.3 F  Temperature Anatomical Location: Oral      Blood Sample Collection for Humoral Immunogenicity:   Was the sample collected?: Yes  Sample collected by: Eveline Landers  Date of collection:  06-MAY-2022  Time of collection: 12:34PM  Specimen Type: Serum  mL collected: 10 (NON-immuno subset) (15 for homologous subset and immuno subset only)  Accession Number: [id number]      Next Visit:  Was Year 2 scheduled? yes   If yes, when? 14-OCT-2022 at 12:30pm       I have expressed importance of returning on scheduling visit date and time. Patient has had funds added to their ClinCard and visit was updated in On[employer].   Subject was instructed to complete eDiary twice weekly for the remainder of the study.

## 2022-08-16 ENCOUNTER — TELEPHONE (OUTPATIENT)
Dept: RESEARCH | Facility: OTHER | Age: 82
End: 2022-08-16
Payer: MEDICARE

## 2022-08-16 NOTE — TELEPHONE ENCOUNTER
Sponsor: [employer]   Study: A Randomized, Double-blind, Placebo-controlled Phase 3 Study to Assess the Efficacy and Safety of Ad26.COV2.S for the Prevention of SARS-CoV-2-mediated COVID-19 in Adults Aged 18 Years and Older   IRB/Protocol #: 2020.275/ [id number]; Phase 3?   Principle Investigator/Sub-Investigator: MARS Ramírez  Site Number: [id number]  Location: [facility]  Subject Number: [id number]    Protocol Amendment 7 Contact    Site contacted participant to schedule Re-Consent Visit for Protocol Amendment 7 and to discuss significant changes to the protocol outlined in participant letter dated 10MAY2022. Participant voiced understanding.    Participant scheduled for Re-consent Visit? yes   Re-Consent Visit scheduled for: 24-AUG-2022 at 12:30pm

## 2022-08-16 NOTE — TELEPHONE ENCOUNTER
Sponsor: [employer]   Study: A Randomized, Double-blind, Placebo-controlled Phase 3 Study to Assess the Efficacy and Safety of Ad26.COV2.S for the Prevention of SARS-CoV-2-mediated COVID-19 in Adults Aged 18 Years and Older   IRB/Protocol #: 2020.275/ [id number]; Phase 3?   Principle Investigator/Sub-Investigator: MARS Ramírez  Site Number: [id number]  Location: [facility]  Subject Number: [id number]    Protocol Amendment 7 Contact    Site contacted participant to schedule Re-Consent Visit for Protocol Amendment 7 and to discuss significant changes to the protocol outlined in participant letter dated 10MAY2022. Participant did not answer, so a voicemail was left.     Participant scheduled for Re-consent Visit? no

## 2022-08-17 ENCOUNTER — TELEPHONE (OUTPATIENT)
Dept: RESEARCH | Facility: CLINIC | Age: 82
End: 2022-08-17
Payer: MEDICARE

## 2022-08-17 NOTE — TELEPHONE ENCOUNTER
Sponsor: [employer]   Study: A Randomized, Double-blind, Placebo-controlled Phase 3 Study to Assess the Efficacy and Safety of Ad26.COV2.S for the Prevention of SARS-CoV-2-mediated COVID-19 in Adults Aged 18 Years and Older   IRB/Protocol #: 2020.275/ [id number]; Phase 3?   Principle Investigator/Sub-Investigator: Dr. Qamar Martin  Site Number: [id number]  Location: [facility]  Subject Number: [id number]    ** LATE ENTRY **     Participant  Qiana Nettles  contacted site to re confirm her participation in the studyQiana Nettles was contacted on 16 Aug 2022.     Participant contacted site to confirm her participation in the study . Participant was concerned after her study hub stopped working and thought she was discontinued from the study. Site staff explained that she is still on the study and that Study Hub stopped working due to changes in the study. Site staff further explained that we  sent out a letter regarding changes in the study after PA 7 implementation. Participant stated that she had not received the letter. Site staff explained that the letter was on its way to her residence and that she should receive it soon. Site staff than explained the changes in the study including the deactivation of study hub, the discontinuation of the Covid-19 cycle and the last visit moving form an in person visit to a phone call. Site staff than explained that due to those changes she would need to come in for a re-consent. participant sated that she understood and that she was willing to come in for a re-consent. I explained that since she is a [facility] participant I can not scheduled her due to not having acces to the [facility] schedule. I further explained that I would reach out to the [facility] staff and have them contact her to schedule a visit. Participant understood and requested that if she is contacted by the the other site that the site leave their number or a text message explaining who they are  and what they want. Site staff stated that they would let the other site know. Site saff thanked participant and ended the call.

## 2022-08-24 ENCOUNTER — RESEARCH ENCOUNTER (OUTPATIENT)
Dept: RESEARCH | Facility: CLINIC | Age: 82
End: 2022-08-24
Payer: MEDICARE

## 2022-08-24 NOTE — PROGRESS NOTES
Sponsor: [employer]   Study: A Randomized, Double-blind, Placebo-controlled Phase 3 Study to Assess the Efficacy and Safety of Ad26.COV2.S for the Prevention of SARS-CoV-2-mediated COVID-19 in Adults Aged 18 Years and Older   IRB/Protocol #: 2020.275/ [id number]; Phase 3?   Principle Investigator/Sub-Investigator: MARS Ramírez  Site Number: [id number]  Location: [facility]  Subject Number: [id number]    Protocol Amendment 7 - Unscheduled Re-Consent Visit    Informed Consent:   Is this a reconsent?: yes  Consenting was completed in private area using the most current IRB approved version of the ICF dated 02-AUG-2022   by myself and patient was given ample time to review consent prior to execution of ICF.    Prior to the Informed Consent (IC) being signed, or any study protocol required data collection, testing, procedure, or intervention being performed, the following was done and/or discussed:?    Patient was given a copy of the IC for review??    Purpose of the study and qualifications to participate??    Study design, follow up schedule, and tests or procedures done at each visit?    Confidentiality and HIPAA Authorization for Release of Medical Records for the research trial/ subject's rights/research related injury?    Risk, Benefits, Alternative Treatments, Compensation and Costs?    Participation in the research trial is voluntary and patient may withdraw at anytime?    Contact information for study related questions?     Patient verbalizes understanding of the above: Yes?   Contact information for CRC and PI given to patient: Yes?   Patient able to adequately summarize: the purpose of the study, the risks associated with the study, and all procedures, testing, and follow-ups associated with the study: Yes?     Qiana REMI Tubbsay signed the IRB approved version of informed consent form for the [employer] research study.? Each page of the consent was  reviewed with the patient and patient's family) and all questions answered satisfactorily. The patient signed the e-consent form and received a copy of same (copy of informed consent made and provided to patient). The original consent was scanned into electronic medical records (EPIC).      Next Visit:  Is subject enrolled in the immunogenicity or booster subset? no  If yes, IN-PERSON Year 2 Visit scheduled? no  If yes, when?     If subject is not in one of the above subsets* Participant understands that Year 2 visit will be conducted via telephone and site will contact subject the day the window for that visit opens? yes    Subject instructed to delete the Study Hub alexandro or return provisional device to site. Subject has had funds added to their ClinCard and visit was updated in [employer].

## 2022-10-18 ENCOUNTER — RESEARCH ENCOUNTER (OUTPATIENT)
Dept: RESEARCH | Facility: OTHER | Age: 82
End: 2022-10-18
Payer: MEDICARE

## 2022-10-18 NOTE — PROGRESS NOTES
Sponsor: [employer]   Study: A Randomized, Double-blind, Placebo-controlled Phase 3 Study to Assess the Efficacy and Safety of Ad26.COV2.S for the Prevention of SARS-CoV-2-mediated COVID-19 in Adults Aged 18 Years and Older   IRB/Protocol #: 2020.275/ [id number]; Phase 3?   Principle Investigator/Sub-Investigator: Dr. Qamar Martin  Site Number: [id number]  Location: [facility] [place]  Subject Number: [id number]    Year 2 (Booster +52 Weeks) Telephone Visit    Has the participant tested positive COVID-19 since last visit? no  If yes, date of positive test?   If yes, did the participant take any medication for COVID-19?   If yes, use .PATIENCEW to document any medications taken.      Did the subject have any ongoing AE's no  If yes, AE end date?     Did the subject have a provisional device? No  Subject was instructed to return provisional device?   Provisional device number:     Did the subject use a personal device? yes  Subject was instructed to delete Study Hub from device? yes      Subject was informed that from today, , forward, study participation has concluded. Subject expressed understanding and was thanked for their participation in this study.    Off Study Date: 18-OCT-2022  Off Study Reason: Protocol Defined Follow Up Completed

## 2023-04-10 ENCOUNTER — HOSPITAL ENCOUNTER (OUTPATIENT)
Facility: HOSPITAL | Age: 83
Discharge: HOME | End: 2023-04-11
Attending: STUDENT IN AN ORGANIZED HEALTH CARE EDUCATION/TRAINING PROGRAM | Admitting: STUDENT IN AN ORGANIZED HEALTH CARE EDUCATION/TRAINING PROGRAM
Payer: COMMERCIAL

## 2023-04-10 DIAGNOSIS — R19.7 DIARRHEA, UNSPECIFIED TYPE: ICD-10-CM

## 2023-04-10 DIAGNOSIS — R10.9 ABDOMINAL PAIN, UNSPECIFIED ABDOMINAL LOCATION: Primary | ICD-10-CM

## 2023-04-10 DIAGNOSIS — K83.8 COMMON BILE DUCT DILATATION: ICD-10-CM

## 2023-04-10 DIAGNOSIS — R07.9 CHEST PAIN: ICD-10-CM

## 2023-04-10 PROBLEM — E03.9 HYPOTHYROIDISM: Status: ACTIVE | Noted: 2019-03-20

## 2023-04-10 PROBLEM — R74.01 TRANSAMINITIS: Status: ACTIVE | Noted: 2023-04-10

## 2023-04-10 PROBLEM — N83.201 RIGHT OVARIAN CYST: Status: ACTIVE | Noted: 2023-04-10

## 2023-04-10 PROBLEM — R17 TOTAL BILIRUBIN, ELEVATED: Status: ACTIVE | Noted: 2023-04-10

## 2023-04-10 PROBLEM — J45.20 INTERMITTENT ASTHMA, UNCOMPLICATED: Status: ACTIVE | Noted: 2022-10-31

## 2023-04-10 PROBLEM — M85.859 OSTEOPENIA OF HIP: Status: ACTIVE | Noted: 2022-10-31

## 2023-04-10 LAB
ALBUMIN SERPL BCP-MCNC: 4 G/DL (ref 3.5–5.2)
ALP SERPL-CCNC: 108 U/L (ref 55–135)
ALT SERPL W/O P-5'-P-CCNC: 51 U/L (ref 10–44)
ANION GAP SERPL CALC-SCNC: 11 MMOL/L (ref 8–16)
AST SERPL-CCNC: 76 U/L (ref 10–40)
BASOPHILS # BLD AUTO: 0.02 K/UL (ref 0–0.2)
BASOPHILS NFR BLD: 0.3 % (ref 0–1.9)
BILIRUB SERPL-MCNC: 1.4 MG/DL (ref 0.1–1)
BUN SERPL-MCNC: 17 MG/DL (ref 8–23)
CALCIUM SERPL-MCNC: 9.3 MG/DL (ref 8.7–10.5)
CHLORIDE SERPL-SCNC: 106 MMOL/L (ref 95–110)
CO2 SERPL-SCNC: 21 MMOL/L (ref 23–29)
CREAT SERPL-MCNC: 0.8 MG/DL (ref 0.5–1.4)
DIFFERENTIAL METHOD: ABNORMAL
EOSINOPHIL # BLD AUTO: 0 K/UL (ref 0–0.5)
EOSINOPHIL NFR BLD: 0.3 % (ref 0–8)
ERYTHROCYTE [DISTWIDTH] IN BLOOD BY AUTOMATED COUNT: 13.8 % (ref 11.5–14.5)
EST. GFR  (NO RACE VARIABLE): >60 ML/MIN/1.73 M^2
GLUCOSE SERPL-MCNC: 105 MG/DL (ref 70–110)
HCT VFR BLD AUTO: 39.7 % (ref 37–48.5)
HCV AB SERPL QL IA: NORMAL
HGB BLD-MCNC: 12.5 G/DL (ref 12–16)
HIV 1+2 AB+HIV1 P24 AG SERPL QL IA: NORMAL
IMM GRANULOCYTES # BLD AUTO: 0.02 K/UL (ref 0–0.04)
IMM GRANULOCYTES NFR BLD AUTO: 0.3 % (ref 0–0.5)
LIPASE SERPL-CCNC: 19 U/L (ref 4–60)
LYMPHOCYTES # BLD AUTO: 0.3 K/UL (ref 1–4.8)
LYMPHOCYTES NFR BLD: 3.9 % (ref 18–48)
MCH RBC QN AUTO: 28.7 PG (ref 27–31)
MCHC RBC AUTO-ENTMCNC: 31.5 G/DL (ref 32–36)
MCV RBC AUTO: 91 FL (ref 82–98)
MONOCYTES # BLD AUTO: 0.2 K/UL (ref 0.3–1)
MONOCYTES NFR BLD: 2.6 % (ref 4–15)
NEUTROPHILS # BLD AUTO: 7.1 K/UL (ref 1.8–7.7)
NEUTROPHILS NFR BLD: 92.6 % (ref 38–73)
NRBC BLD-RTO: 0 /100 WBC
PLATELET # BLD AUTO: 171 K/UL (ref 150–450)
PMV BLD AUTO: 11.6 FL (ref 9.2–12.9)
POTASSIUM SERPL-SCNC: 3.9 MMOL/L (ref 3.5–5.1)
PROT SERPL-MCNC: 7.1 G/DL (ref 6–8.4)
RBC # BLD AUTO: 4.36 M/UL (ref 4–5.4)
SODIUM SERPL-SCNC: 138 MMOL/L (ref 136–145)
WBC # BLD AUTO: 7.68 K/UL (ref 3.9–12.7)

## 2023-04-10 PROCEDURE — 87389 HIV-1 AG W/HIV-1&-2 AB AG IA: CPT | Performed by: PHYSICIAN ASSISTANT

## 2023-04-10 PROCEDURE — 86803 HEPATITIS C AB TEST: CPT | Performed by: PHYSICIAN ASSISTANT

## 2023-04-10 PROCEDURE — 83690 ASSAY OF LIPASE: CPT | Performed by: EMERGENCY MEDICINE

## 2023-04-10 PROCEDURE — 80053 COMPREHEN METABOLIC PANEL: CPT | Performed by: EMERGENCY MEDICINE

## 2023-04-10 PROCEDURE — 25500020 PHARM REV CODE 255: Performed by: STUDENT IN AN ORGANIZED HEALTH CARE EDUCATION/TRAINING PROGRAM

## 2023-04-10 PROCEDURE — 85025 COMPLETE CBC W/AUTO DIFF WBC: CPT | Performed by: EMERGENCY MEDICINE

## 2023-04-10 PROCEDURE — 96360 HYDRATION IV INFUSION INIT: CPT | Mod: 59

## 2023-04-10 PROCEDURE — G0378 HOSPITAL OBSERVATION PER HR: HCPCS

## 2023-04-10 PROCEDURE — 99285 EMERGENCY DEPT VISIT HI MDM: CPT | Mod: ,,, | Performed by: STUDENT IN AN ORGANIZED HEALTH CARE EDUCATION/TRAINING PROGRAM

## 2023-04-10 PROCEDURE — 63600175 PHARM REV CODE 636 W HCPCS: Performed by: STUDENT IN AN ORGANIZED HEALTH CARE EDUCATION/TRAINING PROGRAM

## 2023-04-10 PROCEDURE — 99285 PR EMERGENCY DEPT VISIT,LEVEL V: ICD-10-PCS | Mod: ,,, | Performed by: STUDENT IN AN ORGANIZED HEALTH CARE EDUCATION/TRAINING PROGRAM

## 2023-04-10 PROCEDURE — 96361 HYDRATE IV INFUSION ADD-ON: CPT

## 2023-04-10 PROCEDURE — 99285 EMERGENCY DEPT VISIT HI MDM: CPT | Mod: 25

## 2023-04-10 PROCEDURE — 99223 PR INITIAL HOSPITAL CARE,LEVL III: ICD-10-PCS | Mod: ,,,

## 2023-04-10 PROCEDURE — 99223 1ST HOSP IP/OBS HIGH 75: CPT | Mod: ,,,

## 2023-04-10 RX ORDER — ACETAMINOPHEN 500 MG
1000 TABLET ORAL EVERY 8 HOURS PRN
Status: DISCONTINUED | OUTPATIENT
Start: 2023-04-10 | End: 2023-04-11 | Stop reason: HOSPADM

## 2023-04-10 RX ORDER — BISACODYL 10 MG
10 SUPPOSITORY, RECTAL RECTAL DAILY PRN
Status: DISCONTINUED | OUTPATIENT
Start: 2023-04-10 | End: 2023-04-11 | Stop reason: HOSPADM

## 2023-04-10 RX ORDER — POLYETHYLENE GLYCOL 3350 17 G/17G
17 POWDER, FOR SOLUTION ORAL 2 TIMES DAILY PRN
Status: DISCONTINUED | OUTPATIENT
Start: 2023-04-10 | End: 2023-04-11 | Stop reason: HOSPADM

## 2023-04-10 RX ORDER — DEXTROSE 40 %
30 GEL (GRAM) ORAL
Status: DISCONTINUED | OUTPATIENT
Start: 2023-04-10 | End: 2023-04-11 | Stop reason: HOSPADM

## 2023-04-10 RX ORDER — ASCORBIC ACID 500 MG
500 TABLET ORAL
COMMUNITY

## 2023-04-10 RX ORDER — GLUCAGON 1 MG
1 KIT INJECTION
Status: DISCONTINUED | OUTPATIENT
Start: 2023-04-10 | End: 2023-04-11 | Stop reason: HOSPADM

## 2023-04-10 RX ORDER — ALBUTEROL SULFATE 90 UG/1
2 AEROSOL, METERED RESPIRATORY (INHALATION) EVERY 6 HOURS PRN
COMMUNITY
Start: 2022-10-21 | End: 2023-04-11

## 2023-04-10 RX ORDER — PROCHLORPERAZINE EDISYLATE 5 MG/ML
5 INJECTION INTRAMUSCULAR; INTRAVENOUS EVERY 6 HOURS PRN
Status: DISCONTINUED | OUTPATIENT
Start: 2023-04-10 | End: 2023-04-11 | Stop reason: HOSPADM

## 2023-04-10 RX ORDER — MAG HYDROX/ALUMINUM HYD/SIMETH 200-200-20
30 SUSPENSION, ORAL (FINAL DOSE FORM) ORAL 4 TIMES DAILY PRN
Status: DISCONTINUED | OUTPATIENT
Start: 2023-04-10 | End: 2023-04-11 | Stop reason: HOSPADM

## 2023-04-10 RX ORDER — SODIUM CHLORIDE 0.9 % (FLUSH) 0.9 %
5 SYRINGE (ML) INJECTION
Status: DISCONTINUED | OUTPATIENT
Start: 2023-04-10 | End: 2023-04-11 | Stop reason: HOSPADM

## 2023-04-10 RX ORDER — TALC
6 POWDER (GRAM) TOPICAL NIGHTLY PRN
Status: DISCONTINUED | OUTPATIENT
Start: 2023-04-10 | End: 2023-04-11 | Stop reason: HOSPADM

## 2023-04-10 RX ORDER — ACETAMINOPHEN 325 MG/1
650 TABLET ORAL EVERY 4 HOURS PRN
Status: DISCONTINUED | OUTPATIENT
Start: 2023-04-10 | End: 2023-04-11 | Stop reason: HOSPADM

## 2023-04-10 RX ORDER — ONDANSETRON 8 MG/1
8 TABLET, ORALLY DISINTEGRATING ORAL EVERY 8 HOURS PRN
Status: DISCONTINUED | OUTPATIENT
Start: 2023-04-10 | End: 2023-04-11 | Stop reason: HOSPADM

## 2023-04-10 RX ORDER — SODIUM CHLORIDE, SODIUM LACTATE, POTASSIUM CHLORIDE, CALCIUM CHLORIDE 600; 310; 30; 20 MG/100ML; MG/100ML; MG/100ML; MG/100ML
INJECTION, SOLUTION INTRAVENOUS CONTINUOUS
Status: ACTIVE | OUTPATIENT
Start: 2023-04-10 | End: 2023-04-11

## 2023-04-10 RX ORDER — SIMETHICONE 80 MG
1 TABLET,CHEWABLE ORAL 4 TIMES DAILY PRN
Status: DISCONTINUED | OUTPATIENT
Start: 2023-04-10 | End: 2023-04-11 | Stop reason: HOSPADM

## 2023-04-10 RX ORDER — NALOXONE HCL 0.4 MG/ML
0.02 VIAL (ML) INJECTION
Status: DISCONTINUED | OUTPATIENT
Start: 2023-04-10 | End: 2023-04-11 | Stop reason: HOSPADM

## 2023-04-10 RX ORDER — ENOXAPARIN SODIUM 100 MG/ML
40 INJECTION SUBCUTANEOUS EVERY 24 HOURS
Status: DISCONTINUED | OUTPATIENT
Start: 2023-04-11 | End: 2023-04-11 | Stop reason: HOSPADM

## 2023-04-10 RX ORDER — DEXTROSE 40 %
15 GEL (GRAM) ORAL
Status: DISCONTINUED | OUTPATIENT
Start: 2023-04-10 | End: 2023-04-11 | Stop reason: HOSPADM

## 2023-04-10 RX ORDER — VALACYCLOVIR HYDROCHLORIDE 1 G/1
2000 TABLET, FILM COATED ORAL 2 TIMES DAILY PRN
COMMUNITY
Start: 2022-10-31 | End: 2023-04-11

## 2023-04-10 RX ORDER — OMEGA-3 FATTY ACIDS 1000 MG
2 CAPSULE ORAL DAILY
COMMUNITY

## 2023-04-10 RX ADMIN — SODIUM CHLORIDE, POTASSIUM CHLORIDE, SODIUM LACTATE AND CALCIUM CHLORIDE 1000 ML: 600; 310; 30; 20 INJECTION, SOLUTION INTRAVENOUS at 05:04

## 2023-04-10 RX ADMIN — IOHEXOL 75 ML: 350 INJECTION, SOLUTION INTRAVENOUS at 08:04

## 2023-04-10 NOTE — ED PROVIDER NOTES
Encounter Date: 4/10/2023       History     Chief Complaint   Patient presents with    Abdominal Pain    Diarrhea     HPI  83-year-old female history of appendectomy, cholecystectomy and low vertical  presenting to the ED with multiple episodes of diarrhea starting at 5:00 a.m. this morning.  Patient states she is had progressively worsening diarrhea and then developed mid abdominal pain that she describes as severe and sharp.  Patient states the pain comes and goes.  Patient denies any history of diverticulitis.  Denies fevers or chills.  Patient noted some nausea earlier today but has not vomited.  Patient denies abdominal distention. Pt otherwise at her baseline besides diarrhea and some weakness.   Review of patient's allergies indicates:  No Known Allergies  Past Medical History:   Diagnosis Date    H/O eye surgery     at age 8 (two done)    History of appendectomy     age 34 or 36    History of removal of ovarian cyst     Hypothyroidism      Past Surgical History:   Procedure Laterality Date     SECTION      age 41    EYE SURGERY      age 8     No family history on file.  Social History     Tobacco Use    Smoking status: Former     Types: Cigarettes     Quit date: 1968     Years since quittin.6    Smokeless tobacco: Never   Substance Use Topics    Alcohol use: Yes     Comment: socially (champagne)    Drug use: No     Review of Systems   Constitutional:  Positive for fatigue. Negative for chills, diaphoresis and fever.   HENT:  Negative for congestion and sore throat.    Eyes:  Negative for visual disturbance.   Respiratory:  Negative for cough and shortness of breath.    Cardiovascular:  Negative for chest pain and leg swelling.   Gastrointestinal:  Positive for abdominal pain, diarrhea and nausea. Negative for abdominal distention, blood in stool and vomiting.   Genitourinary:  Negative for dysuria.   Musculoskeletal:  Negative for back pain.   Skin:  Negative for rash.    Neurological:  Negative for dizziness, syncope, facial asymmetry, weakness, numbness and headaches.   Hematological:  Does not bruise/bleed easily.     Physical Exam     Initial Vitals [04/10/23 1653]   BP Pulse Resp Temp SpO2   (!) 114/54 (!) 59 20 97.4 °F (36.3 °C) 100 %      MAP       --         Physical Exam    Nursing note and vitals reviewed.  Constitutional: She appears well-nourished. She is not diaphoretic.   HENT:   Head: Normocephalic and atraumatic.   Left Ear: External ear normal.   Mouth/Throat: Oropharynx is clear and moist.   Eyes: EOM are normal. Pupils are equal, round, and reactive to light.   Neck: Neck supple.   Normal range of motion.  Cardiovascular:  Normal rate and regular rhythm.           No murmur heard.  Pulmonary/Chest: Breath sounds normal. No respiratory distress. She has no wheezes. She has no rhonchi. She has no rales.   Abdominal: Abdomen is soft. Bowel sounds are normal. She exhibits no distension. There is no abdominal tenderness. There is no rebound and no guarding.   Musculoskeletal:         General: No edema. Normal range of motion.      Cervical back: Normal range of motion and neck supple.     Neurological: She is alert and oriented to person, place, and time. She has normal strength. No sensory deficit. GCS score is 15. GCS eye subscore is 4. GCS verbal subscore is 5. GCS motor subscore is 6.   Skin: Skin is warm and dry. Capillary refill takes less than 2 seconds.   Psychiatric: She has a normal mood and affect.       ED Course   Procedures  Labs Reviewed   CBC W/ AUTO DIFFERENTIAL - Abnormal; Notable for the following components:       Result Value    MCHC 31.5 (*)     Lymph # 0.3 (*)     Mono # 0.2 (*)     Gran % 92.6 (*)     Lymph % 3.9 (*)     Mono % 2.6 (*)     All other components within normal limits    Narrative:     Release to patient->Immediate   COMPREHENSIVE METABOLIC PANEL - Abnormal; Notable for the following components:    CO2 21 (*)     Total Bilirubin  1.4 (*)     AST 76 (*)     ALT 51 (*)     All other components within normal limits    Narrative:     Release to patient->Immediate   CLOSTRIDIUM DIFFICILE   CULTURE, STOOL   LIPASE    Narrative:     Release to patient->Immediate   HIV 1 / 2 ANTIBODY    Narrative:     Release to patient->Immediate   HEPATITIS C ANTIBODY    Narrative:     Release to patient->Immediate   URINALYSIS, REFLEX TO URINE CULTURE   CALPROTECTIN, STOOL   WBC, STOOL   GIARDIA/CRYPTOSPORIDIUM (EIA)   STOOL EXAM-OVA,CYSTS,PARASITES          Imaging Results               CT Abdomen Pelvis With Contrast (Final result)  Result time 04/10/23 21:35:13      Final result by Jerzy Oglesby MD (04/10/23 21:35:13)                   Impression:      1. Prominent dilation of the intrahepatic and extrahepatic biliary ducts.  The common duct measures 2.3 cm.  Narrowing or stricture at the ampulla is a consideration.  No obstructing lesion is detected.  A prominent postoperative change cannot be excluded.  2. Mild splenomegaly.  3. 4.9 cm right adnexal cystic lesion could represent a right ovarian cyst.  Cystic neoplasm is not excluded.  Gyn follow-up is recommended.  See above comments.  4. Diverticulosis of the sigmoid colon.  5. Nondistention of the stomach.  Mild mucosal thickening/gastritis cannot be excluded.  6. Hepatic cyst.  7. Status post cholecystectomy.  8.  This report was flagged in Epic as abnormal.      Electronically signed by: Jerzy Oglesby  Date:    04/10/2023  Time:    21:35               Narrative:    EXAMINATION:  CT ABDOMEN PELVIS WITH CONTRAST    CLINICAL HISTORY:  Abdominal abscess/infection suspected;    TECHNIQUE:  Low dose axial images, sagittal and coronal reformations were obtained from the lung bases to the pubic symphysis following the IV administration of 75 mL of Omnipaque 350 .  Oral contrast was not administered.    COMPARISON:  None.    FINDINGS:  Abdomen:    - Lower thorax:    - Lung bases: No infiltrates and no  nodules.    - Liver: 1.3 cm hepatic cyst in the anterior left lobe.    - Gallbladder: Status post cholecystectomy.    - Bile Ducts: There is prominent dilation of the intrahepatic and extrahepatic biliary ducts.  The common duct measures approximately 2.3 cm.  Narrowing or stricture at the ampulla is a consideration.  No obstructing lesion is identified.    - Spleen: Spleen is mildly enlarged.    - Kidneys: No stone, mass or hydronephrosis bilaterally.  Extrarenal pelvis configuration on the right.    - Adrenals: Unremarkable.    - Pancreas: No mass or peripancreatic fat stranding.  The pancreatic duct is within normal limits.    - Retroperitoneum:  No significant adenopathy.    - Vascular: No abdominal aortic aneurysm.    - Abdominal wall:  Unremarkable.    Pelvis:    Urinary bladder is within normal limits.    The uterus is present.    Bowel/Mesentery:    No evidence of bowel obstruction.  There is diverticulosis of the sigmoid colon.  No evidence of acute diverticulitis.    There is an enlarged 4.9 cm right adnexal ovoid homogeneous thin wall cystic lesion which could represent a right ovarian cyst.  Cystic neoplasm is not excluded.  Gyn follow-up is recommended.    Bones:  No acute fractures..    Stomach is nondistended.  Mild mucosal thickening/gastritis cannot be excluded.                                       Medications   lactated ringers bolus 1,000 mL (0 mLs Intravenous Stopped 4/10/23 2028)   iohexoL (OMNIPAQUE 350) injection 75 mL (75 mLs Intravenous Given 4/10/23 2040)     Medical Decision Making:   Initial Assessment:   83yoF presenting with diarrhea that started at 5am now associated with intermittent sharp abdominal pain. Pt denies vomiting but notes an episode of nausea earlier. Denies cp, sob. Denies fevers but notes feeling warm earlier. VS notable for /53 HR 79. EKG NSR, rate 63. Normal intervals, normal axis. No St elevation or depression. Will give 1L of LR. Will obtain CT abd/pelvis with  contrast to evaluate for diverticulitis. No risk factors for cdiff. No abx use, no recent hospitalizations.   Kell Vargas MD  Emergency Medicine Staff   Critical Care Fellow  5:29 PM       Imaging Results               CT Abdomen Pelvis With Contrast (Final result)  Result time 04/10/23 21:35:13      Final result by Jerzy Oglesby MD (04/10/23 21:35:13)                   Impression:      1. Prominent dilation of the intrahepatic and extrahepatic biliary ducts.  The common duct measures 2.3 cm.  Narrowing or stricture at the ampulla is a consideration.  No obstructing lesion is detected.  A prominent postoperative change cannot be excluded.  2. Mild splenomegaly.  3. 4.9 cm right adnexal cystic lesion could represent a right ovarian cyst.  Cystic neoplasm is not excluded.  Gyn follow-up is recommended.  See above comments.  4. Diverticulosis of the sigmoid colon.  5. Nondistention of the stomach.  Mild mucosal thickening/gastritis cannot be excluded.  6. Hepatic cyst.  7. Status post cholecystectomy.  8.  This report was flagged in Epic as abnormal.      Electronically signed by: Jerzy Oglesby  Date:    04/10/2023  Time:    21:35               Narrative:    EXAMINATION:  CT ABDOMEN PELVIS WITH CONTRAST    CLINICAL HISTORY:  Abdominal abscess/infection suspected;    TECHNIQUE:  Low dose axial images, sagittal and coronal reformations were obtained from the lung bases to the pubic symphysis following the IV administration of 75 mL of Omnipaque 350 .  Oral contrast was not administered.    COMPARISON:  None.    FINDINGS:  Abdomen:    - Lower thorax:    - Lung bases: No infiltrates and no nodules.    - Liver: 1.3 cm hepatic cyst in the anterior left lobe.    - Gallbladder: Status post cholecystectomy.    - Bile Ducts: There is prominent dilation of the intrahepatic and extrahepatic biliary ducts.  The common duct measures approximately 2.3 cm.  Narrowing or stricture at the ampulla is a consideration.  No  obstructing lesion is identified.    - Spleen: Spleen is mildly enlarged.    - Kidneys: No stone, mass or hydronephrosis bilaterally.  Extrarenal pelvis configuration on the right.    - Adrenals: Unremarkable.    - Pancreas: No mass or peripancreatic fat stranding.  The pancreatic duct is within normal limits.    - Retroperitoneum:  No significant adenopathy.    - Vascular: No abdominal aortic aneurysm.    - Abdominal wall:  Unremarkable.    Pelvis:    Urinary bladder is within normal limits.    The uterus is present.    Bowel/Mesentery:    No evidence of bowel obstruction.  There is diverticulosis of the sigmoid colon.  No evidence of acute diverticulitis.    There is an enlarged 4.9 cm right adnexal ovoid homogeneous thin wall cystic lesion which could represent a right ovarian cyst.  Cystic neoplasm is not excluded.  Gyn follow-up is recommended.    Bones:  No acute fractures..    Stomach is nondistended.  Mild mucosal thickening/gastritis cannot be excluded.                                                  Update  Discussed results of CT scan with GI. They will do MRCP tomorrow. Recommended stool studies and cdiff.     Pt to be admitted to hospital medicine for further evaluation and treatment.     Kell Vargas MD  Emergency Medicine Staff   Critical Care Fellow  10:05 PM         Clinical Impression:   Final diagnoses:  [R19.7] Diarrhea, unspecified type (Primary)  [R10.9] Abdominal pain, unspecified abdominal location        ED Disposition Condition    Observation Stable                Kell Vargas MD  04/10/23 [id number]

## 2023-04-10 NOTE — ED NOTES
"The patient came to the ER today with epigastric abd pain that began at 2pm. Last oral intake yesterday 7pm. She woke up with stomach pain and diarrhea. States she did not eat or drink anything today. Pain radiates across the epigastric area but does not radiate into the back. Reports gallbladder removal but this pain "feels like when she had a gallbladder". The pain comes and goes. No blood or dark stools. States she "gagged at 7 this morning" but never vomited. She felt "hot at times from just the pain" but does not report a fever. Denies cough sore throat or runny nose. Denies recent abx use.   "

## 2023-04-10 NOTE — FIRST PROVIDER EVALUATION
"Medical screening examination initiated.  I have conducted a focused provider triage encounter, findings are as follows:    Brief history of present illness:     Work up at 5am with diarrhea, progressively worsening   Now have sharp abdominal pains. Severe, worse with breathing   Pain comes and goes.       Vitals:    04/10/23 1653   BP: (!) 114/54   Pulse: (!) 59   Resp: 20   Temp: 97.4 °F (36.3 °C)   TempSrc: Oral   SpO2: 100%   Weight: 54.4 kg (120 lb)   Height: 5' 8" (1.727 m)       Pertinent physical exam:  dry MM    Brief workup plan:  labs, imaging ordered     Preliminary workup initiated; this workup will be continued and followed by the physician or advanced practice provider that is assigned to the patient when roomed.  "

## 2023-04-11 ENCOUNTER — ANESTHESIA EVENT (OUTPATIENT)
Dept: ENDOSCOPY | Facility: HOSPITAL | Age: 83
End: 2023-04-11
Payer: MEDICARE

## 2023-04-11 ENCOUNTER — ANESTHESIA (OUTPATIENT)
Dept: ENDOSCOPY | Facility: HOSPITAL | Age: 83
End: 2023-04-11
Payer: MEDICARE

## 2023-04-11 VITALS
TEMPERATURE: 98 F | BODY MASS INDEX: 18.19 KG/M2 | HEART RATE: 50 BPM | SYSTOLIC BLOOD PRESSURE: 102 MMHG | DIASTOLIC BLOOD PRESSURE: 56 MMHG | RESPIRATION RATE: 18 BRPM | WEIGHT: 120 LBS | OXYGEN SATURATION: 100 % | HEIGHT: 68 IN

## 2023-04-11 LAB
ALBUMIN SERPL BCP-MCNC: 3.2 G/DL (ref 3.5–5.2)
ALP SERPL-CCNC: 89 U/L (ref 55–135)
ALT SERPL W/O P-5'-P-CCNC: 49 U/L (ref 10–44)
ANION GAP SERPL CALC-SCNC: 8 MMOL/L (ref 8–16)
AST SERPL-CCNC: 50 U/L (ref 10–40)
BILIRUB SERPL-MCNC: 1.2 MG/DL (ref 0.1–1)
BILIRUB UR QL STRIP: NEGATIVE
BUN SERPL-MCNC: 13 MG/DL (ref 8–23)
CALCIUM SERPL-MCNC: 8.7 MG/DL (ref 8.7–10.5)
CHLORIDE SERPL-SCNC: 108 MMOL/L (ref 95–110)
CLARITY UR REFRACT.AUTO: CLEAR
CO2 SERPL-SCNC: 21 MMOL/L (ref 23–29)
COLOR UR AUTO: YELLOW
CREAT SERPL-MCNC: 0.7 MG/DL (ref 0.5–1.4)
ERYTHROCYTE [DISTWIDTH] IN BLOOD BY AUTOMATED COUNT: 13.8 % (ref 11.5–14.5)
EST. GFR  (NO RACE VARIABLE): >60 ML/MIN/1.73 M^2
GLUCOSE SERPL-MCNC: 80 MG/DL (ref 70–110)
GLUCOSE UR QL STRIP: NEGATIVE
HCT VFR BLD AUTO: 34.6 % (ref 37–48.5)
HGB BLD-MCNC: 10.7 G/DL (ref 12–16)
HGB UR QL STRIP: NEGATIVE
KETONES UR QL STRIP: ABNORMAL
LEUKOCYTE ESTERASE UR QL STRIP: NEGATIVE
MAGNESIUM SERPL-MCNC: 1.8 MG/DL (ref 1.6–2.6)
MCH RBC QN AUTO: 28.8 PG (ref 27–31)
MCHC RBC AUTO-ENTMCNC: 30.9 G/DL (ref 32–36)
MCV RBC AUTO: 93 FL (ref 82–98)
NITRITE UR QL STRIP: NEGATIVE
PH UR STRIP: 6 [PH] (ref 5–8)
PLATELET # BLD AUTO: 126 K/UL (ref 150–450)
PMV BLD AUTO: 11.5 FL (ref 9.2–12.9)
POTASSIUM SERPL-SCNC: 3.6 MMOL/L (ref 3.5–5.1)
PROT SERPL-MCNC: 5.6 G/DL (ref 6–8.4)
PROT UR QL STRIP: ABNORMAL
RBC # BLD AUTO: 3.72 M/UL (ref 4–5.4)
SODIUM SERPL-SCNC: 137 MMOL/L (ref 136–145)
SP GR UR STRIP: >1.03 (ref 1–1.03)
URN SPEC COLLECT METH UR: ABNORMAL
WBC # BLD AUTO: 4.68 K/UL (ref 3.9–12.7)

## 2023-04-11 PROCEDURE — 37000009 HC ANESTHESIA EA ADD 15 MINS: Performed by: INTERNAL MEDICINE

## 2023-04-11 PROCEDURE — 85027 COMPLETE CBC AUTOMATED: CPT

## 2023-04-11 PROCEDURE — 83735 ASSAY OF MAGNESIUM: CPT

## 2023-04-11 PROCEDURE — 99239 HOSP IP/OBS DSCHRG MGMT >30: CPT | Mod: ,,,

## 2023-04-11 PROCEDURE — 25500020 PHARM REV CODE 255: Performed by: INTERNAL MEDICINE

## 2023-04-11 PROCEDURE — 96372 THER/PROPH/DIAG INJ SC/IM: CPT | Mod: 59

## 2023-04-11 PROCEDURE — 43259 EGD US EXAM DUODENUM/JEJUNUM: CPT | Performed by: INTERNAL MEDICINE

## 2023-04-11 PROCEDURE — 43259 EGD US EXAM DUODENUM/JEJUNUM: CPT | Mod: ,,, | Performed by: INTERNAL MEDICINE

## 2023-04-11 PROCEDURE — G0378 HOSPITAL OBSERVATION PER HR: HCPCS

## 2023-04-11 PROCEDURE — 63600175 PHARM REV CODE 636 W HCPCS

## 2023-04-11 PROCEDURE — 99223 1ST HOSP IP/OBS HIGH 75: CPT | Mod: 25,GC,, | Performed by: INTERNAL MEDICINE

## 2023-04-11 PROCEDURE — D9220A PRA ANESTHESIA: Mod: ANES,,, | Performed by: ANESTHESIOLOGY

## 2023-04-11 PROCEDURE — D9220A PRA ANESTHESIA: ICD-10-PCS | Mod: CRNA,,, | Performed by: NURSE ANESTHETIST, CERTIFIED REGISTERED

## 2023-04-11 PROCEDURE — A9585 GADOBUTROL INJECTION: HCPCS | Performed by: INTERNAL MEDICINE

## 2023-04-11 PROCEDURE — 63600175 PHARM REV CODE 636 W HCPCS: Performed by: NURSE ANESTHETIST, CERTIFIED REGISTERED

## 2023-04-11 PROCEDURE — 80053 COMPREHEN METABOLIC PANEL: CPT

## 2023-04-11 PROCEDURE — 25000003 PHARM REV CODE 250: Performed by: NURSE ANESTHETIST, CERTIFIED REGISTERED

## 2023-04-11 PROCEDURE — D9220A PRA ANESTHESIA: Mod: CRNA,,, | Performed by: NURSE ANESTHETIST, CERTIFIED REGISTERED

## 2023-04-11 PROCEDURE — 99223 PR INITIAL HOSPITAL CARE,LEVL III: ICD-10-PCS | Mod: 25,GC,, | Performed by: INTERNAL MEDICINE

## 2023-04-11 PROCEDURE — 81003 URINALYSIS AUTO W/O SCOPE: CPT | Performed by: EMERGENCY MEDICINE

## 2023-04-11 PROCEDURE — D9220A PRA ANESTHESIA: ICD-10-PCS | Mod: ANES,,, | Performed by: ANESTHESIOLOGY

## 2023-04-11 PROCEDURE — 99239 PR HOSPITAL DISCHARGE DAY,>30 MIN: ICD-10-PCS | Mod: ,,,

## 2023-04-11 PROCEDURE — 37000008 HC ANESTHESIA 1ST 15 MINUTES: Performed by: INTERNAL MEDICINE

## 2023-04-11 PROCEDURE — 43259 PR ENDOSCOPIC ULTRASOUND EXAM: ICD-10-PCS | Mod: ,,, | Performed by: INTERNAL MEDICINE

## 2023-04-11 RX ORDER — PROPOFOL 10 MG/ML
VIAL (ML) INTRAVENOUS
Status: DISCONTINUED | OUTPATIENT
Start: 2023-04-11 | End: 2023-04-11

## 2023-04-11 RX ORDER — PROPOFOL 10 MG/ML
VIAL (ML) INTRAVENOUS CONTINUOUS PRN
Status: DISCONTINUED | OUTPATIENT
Start: 2023-04-11 | End: 2023-04-11

## 2023-04-11 RX ORDER — GADOBUTROL 604.72 MG/ML
10 INJECTION INTRAVENOUS
Status: COMPLETED | OUTPATIENT
Start: 2023-04-11 | End: 2023-04-11

## 2023-04-11 RX ORDER — ONDANSETRON 2 MG/ML
4 INJECTION INTRAMUSCULAR; INTRAVENOUS ONCE AS NEEDED
Status: DISCONTINUED | OUTPATIENT
Start: 2023-04-11 | End: 2023-04-11 | Stop reason: HOSPADM

## 2023-04-11 RX ORDER — SODIUM CHLORIDE 0.9 % (FLUSH) 0.9 %
10 SYRINGE (ML) INJECTION
Status: DISCONTINUED | OUTPATIENT
Start: 2023-04-11 | End: 2023-04-11 | Stop reason: HOSPADM

## 2023-04-11 RX ORDER — ACETAMINOPHEN 500 MG
1000 TABLET ORAL DAILY PRN
COMMUNITY

## 2023-04-11 RX ADMIN — SODIUM CHLORIDE, POTASSIUM CHLORIDE, SODIUM LACTATE AND CALCIUM CHLORIDE: 600; 310; 30; 20 INJECTION, SOLUTION INTRAVENOUS at 02:04

## 2023-04-11 RX ADMIN — ENOXAPARIN SODIUM 40 MG: 40 INJECTION SUBCUTANEOUS at 04:04

## 2023-04-11 RX ADMIN — GADOBUTROL 10 ML: 604.72 INJECTION INTRAVENOUS at 01:04

## 2023-04-11 RX ADMIN — PROPOFOL 30 MG: 10 INJECTION, EMULSION INTRAVENOUS at 01:04

## 2023-04-11 RX ADMIN — PROPOFOL 150 MCG/KG/MIN: 10 INJECTION, EMULSION INTRAVENOUS at 01:04

## 2023-04-11 RX ADMIN — SODIUM CHLORIDE: 0.9 INJECTION, SOLUTION INTRAVENOUS at 01:04

## 2023-04-11 NOTE — PHARMACY MED REC
"  Admission Medication History     The home medication history was taken by Chica Reynolds.    You may go to "Admission" then "Reconcile Home Medications" tabs to review and/or act upon these items.     The home medication list has been updated by the Pharmacy department.   Please read ALL comments highlighted in yellow.   Please address this information as you see fit.    Feel free to contact us if you have any questions or require assistance.      The medications listed below were removed from the home medication list. Please reorder if appropriate:  Patient reports no longer taking the following medication(s):  ALBUTEROL HFA 90 MCG/ACT INH  TRETINOIN 0.05 % CRM  VALACYCLOVIR 1000 MG       Medications listed below were obtained from: Patient  Current Outpatient Medications on File Prior to Encounter   Medication Sig    acetaminophen (TYLENOL) 500 MG tablet   Take 1,000 mg by mouth daily as needed for Pain.    carboxymethylcellulose sodium (THERATEARS OPHT)   Place 1 drop into both eyes daily as needed (Dry eye).    cyanocobalamin, vitamin B-12, (VITAMIN B-12 ORAL)   Take 1 tablet by mouth once daily.    omega-3 fatty acids 1,000 mg Cap   Take 2 g by mouth once daily.    UNABLE TO FIND Imodium instant ([place]) - Take 2 tablets by mouth initially, then 1 tablet if needed thereafter for diarrhea.      vit A/vit C/vit E/zinc/copper (PRESERVISION AREDS ORAL)   Take 1 capsule by mouth once daily.    ascorbic acid, vitamin C, (VITAMIN C) 500 MG tablet   Take 500 mg by mouth.               Chica Reynolds  EXT [id number]                .        "

## 2023-04-11 NOTE — HPI
"Qiana Nettles is a 82 yo F with PMHx of appendectomy and cholecytectomy (1990s) who presented to the ED for diarrhea. Patient reports diarrhea started at 5am this morning and has progressively worsened throughout the day. Reports ~10 episodes of watery, non bloody diarrhea. Last episode around 3pm. Also admits associated nausea weakness, and intermittent, sharp, 10/10 mid abdominal pain. She took 2 doses of imodium at home. States that abdominal pain is improving and describes it as 2/10 "abdominal soreness". Denies recent antibiotic use or recent hospitalizations. Reports that she has had similar episodes in the past that began after her cholecystectomy, but that she has not had an episode like this in many years. Symptoms usually only lasted one day.  Denies fever, chills, chest pain, palpitations, SOB, cough, abdominal pain, n/v/d, dysuria, headaches and LE swelling.    In ED: Afebrile. Intermittently bradycardic with HR in the high 50s. Hypotensive to 97/53. Given 1L LR. CBC without leukocytosis or anemia. CMP without electrolyte abnormalities, but with T bili 1.4 and transaminits. CT A/P showing prominent dilation of the intrahepatic and extrahepatic biliary ducts and concern for narrowing vs. stricture at the ampula. ED provider discussed with GI. GI recommending MRCP tomorrow and stool studies.   "

## 2023-04-11 NOTE — CONSULTS
"[facility] - Emergency Dept  Gastroenterology  Consult Note    Patient Name: Qiana Nettles  MRN: [id number]  Admission Date: 4/10/2023  Hospital Length of Stay: 0 days  Code Status: Full Code   Attending Provider: Dionisio Dailey MD   Consulting Provider: Braden Calhoun MD  Primary Care Physician: Yusef Anglin MD  Principal Problem:Common bile duct dilatation    Inpatient consult to [facility]  Consult performed by: Braden Calhoun MD  Consult ordered by: Sarahy Gonzalez PA-C        Subjective:     HPI:  83 F w/ PMH appendectomy, cholecystectomy () admitted with one day of acute onset diarrhea and abdominal pain prompting presentation.  Patient reports diarrhea is usually self-limited and happens once every several weeks.  Abdominal pain mid-epigastric/RUQ and similar to previous episodes of pain which will occasionally occur after her cholecystectomy.  She initially had episodes every several months but now they may happen once per year.  Denies fevers, chills, N/V.  Non-bloody diarrhea.  Pain improved since admission.  In ED, patient mildly hypotensive but otherwise stable vitals.  Afebrile.  Labs notable for Bili 1.4, AST 76, ALT 51.  Cr 0.8.  CT Abd/pelvis showign CBD measuring 23mm with intra and extrahepatic biliary dilation.  MRCP confirming "severe intra and extrahepatic biliary ductal dilation...no choledocholithiasis seen."      No therapeutic anticoagulation.        Past Medical History:   Diagnosis Date    H/O eye surgery     at age 8 (two done)    History of appendectomy     age 34 or 36    History of removal of ovarian cyst     Hypothyroidism        Past Surgical History:   Procedure Laterality Date     SECTION      age 41    EYE SURGERY      age 8       Review of patient's allergies indicates:  No Known Allergies  Family History    None       Tobacco Use    Smoking status: Former     Types: Cigarettes     Quit date: 1968     Years since quitting: " 54.6    Smokeless tobacco: Never   Substance and Sexual Activity    Alcohol use: Yes     Comment: socially (champagne)    Drug use: No    Sexual activity: Not on file     Review of Systems   Constitutional:  Negative for fatigue and fever.   Gastrointestinal:  Positive for abdominal pain and diarrhea. Negative for blood in stool.   Objective:     Vital Signs (Most Recent):  Temp: 97.6 °F (36.4 °C) (04/11/23 0817)  Pulse: (!) 55 (04/11/23 0817)  Resp: 16 (04/11/23 0817)  BP: (!) 104/53 (04/11/23 0817)  SpO2: 99 % (04/11/23 0817)   Vital Signs (24h Range):  Temp:  [97.4 °F (36.3 °C)-99.7 °F (37.6 °C)] 97.6 °F (36.4 °C)  Pulse:  [54-79] 55  Resp:  [14-20] 16  SpO2:  [97 %-100 %] 99 %  BP: ()/(51-54) 104/53     Weight: 54.4 kg (120 lb) (04/10/23 1653)  Body mass index is 18.25 kg/m².      Intake/Output Summary (Last 24 hours) at 4/11/2023 0838  Last data filed at 4/10/2023 2028  Gross per 24 hour   Intake 1000 ml   Output --   Net 1000 ml       Lines/Drains/Airways       Peripheral Intravenous Line  Duration                  Peripheral IV - Single Lumen 04/10/23 1715 20 G Right Antecubital <1 day                    Physical Exam    Gen: NAD, lying comfortably  HENT: NCAT, oropharynx clear  Eyes: anicteric sclerae, EOMI grossly  Abd: soft, NT/ND, no rebound  Ext: no LE edema, warm, well perfused  Skin: skin intact on exposed body parts, no visible rashes, lesions  Neuro: A&Ox4, neuro exam grossly intact, moves all extremities  Psych: appropriate mood, affect      Significant Labs:  CBC:   Recent Labs   Lab 04/10/23  1715 04/11/23  0430   WBC 7.68 4.68   HGB 12.5 10.7*   HCT 39.7 34.6*    126*     CMP:   Recent Labs   Lab 04/11/23 0430   GLU 80   CALCIUM 8.7   ALBUMIN 3.2*   PROT 5.6*      K 3.6   CO2 21*      BUN 13   CREATININE 0.7   ALKPHOS 89   ALT 49*   AST 50*   BILITOT 1.2*     Coagulation: No results for input(s): PT, INR, APTT in the last 48 hours.    Significant Imaging:  Imaging  results within the past 24 hours have been reviewed.    Assessment/Plan:     GI  * Common bile duct dilatation  Patient with history of cholecystectomy with acute onset of severe RUQ abdominal pain.  MRCP with evidence of marked biliary dilation without definitive choledocholithiasis.    Recommendations:  -EUS +/- ERCP today for further evaluation  -keep NPO  -hold anticoagulation as able  -trend CBC, CMP, INR  -monitor for signs of cholangitis    Diarrhea  Patient with acute onset of recurrent self limited diarrhea.  Possibly related to history of cholecystectomy.    Recommendations:  -f/u c. Diff, stool culture, giardia/crypto, calprotectin        Thank you for your consult. I will follow-up with patient. Please contact us if you have any additional questions.    Braden Calhoun MD  Gastroenterology  [facility] - Emergency Dept

## 2023-04-11 NOTE — TRANSFER OF CARE
"Anesthesia Transfer of Care Note    Patient: Qiana Nettles    Procedure(s) Performed: Procedure(s) (LRB):  ULTRASOUND, UPPER GI TRACT, ENDOSCOPIC (N/A)  ERCP (ENDOSCOPIC RETROGRADE CHOLANGIOPANCREATOGRAPHY) (N/A)    Patient location: PACU    Anesthesia Type: general    Transport from OR: Transported from OR on 2-3 L/min O2 by NC with adequate spontaneous ventilation    Post pain: adequate analgesia    Post assessment: no apparent anesthetic complications    Post vital signs: stable    Level of consciousness: awake and alert    Nausea/Vomiting: no nausea/vomiting    Complications: none    Transfer of care protocol was followed      Last vitals:   Visit Vitals  BP (!) 103/58 (Patient Position: Lying)   Pulse (!) 50   Temp 36.8 °C (98.2 °F) (Temporal)   Resp 16   Ht 5' 8" (1.727 m)   Wt 54.4 kg (120 lb)   SpO2 100%   Breastfeeding No   BMI 18.25 kg/m²     "

## 2023-04-11 NOTE — PROVATION PATIENT INSTRUCTIONS
Discharge Summary/Instructions after an Endoscopic Procedure  Patient Name: Qiana Jain  Patient MRN: [id number]  Patient YOB: 1940 Tuesday, April 11, 2023  Matt Gregorio MD  Dear patient,  As a result of recent federal legislation (The Federal Cures Act), you may   receive lab or pathology results from your procedure in your [facility]   account before your physician is able to contact you. Your physician or   their representative will relay the results to you with their   recommendations at their soonest availability.  Thank you,  RESTRICTIONS:  During your procedure today, you received medications for sedation.  These   medications may affect your judgment, balance and coordination.  Therefore,   for 24 hours, you have the following restrictions:   - DO NOT drive a car, operate machinery, make legal/financial decisions,   sign important papers or drink alcohol.    ACTIVITY:  Today: no heavy lifting, straining or running due to procedural   sedation/anesthesia.  The following day: return to full activity including work.  DIET:  Eat and drink normally unless instructed otherwise.     TREATMENT FOR COMMON SIDE EFFECTS:  - Mild abdominal pain, nausea, belching, bloating or excessive gas:  rest,   eat lightly and use a heating pad.  - Sore Throat: treat with throat lozenges and/or gargle with warm salt   water.  - Because air was used during the procedure, expelling large amounts of air   from your rectum or belching is normal.  - If a bowel prep was taken, you may not have a bowel movement for 1-3 days.    This is normal.  SYMPTOMS TO WATCH FOR AND REPORT TO YOUR PHYSICIAN:  1. Abdominal pain or bloating, other than gas cramps.  2. Chest pain.  3. Back pain.  4. Signs of infection such as: chills or fever occurring within 24 hours   after the procedure.  5. Rectal bleeding, which would show as bright red, maroon, or black stools.   (A tablespoon of blood from the rectum is not serious, especially if    hemorrhoids are present.)  6. Vomiting.  7. Weakness or dizziness.  GO DIRECTLY TO THE NEAREST EMERGENCY ROOM IF YOU HAVE ANY OF THE FOLLOWING:      Difficulty breathing              Chills and/or fever over 101 F   Persistent vomiting and/or vomiting blood   Severe abdominal pain   Severe chest pain   Black, tarry stools   Bleeding- more than one tablespoon   Any other symptom or condition that you feel may need urgent attention  Your doctor recommends these additional instructions:  If any biopsies were taken, your doctors clinic will contact you in 1 to 2   weeks with any results.  - Return patient to [facility] for ongoing care.   - Observe patient's clinical course.   - Return to ERCP clinic in 1 month.  For questions, problems or results please call your physician - Matt Gregorio MD at Work:  [phone].  [facility], EMERGENCY ROOM PHONE NUMBER: [phone]  IF A COMPLICATION OR EMERGENCY SITUATION ARISES AND YOU ARE UNABLE TO REACH   YOUR PHYSICIAN - GO DIRECTLY TO THE EMERGENCY ROOM.  Matt Gregorio MD  4/11/2023 2:10:53 PM  This report has been verified and signed electronically.  Dear patient,  As a result of recent federal legislation (The Federal Cures Act), you may   receive lab or pathology results from your procedure in your [facility]   account before your physician is able to contact you. Your physician or   their representative will relay the results to you with their   recommendations at their soonest availability.  Thank you,  PROVATION

## 2023-04-11 NOTE — PLAN OF CARE
04/11/23 1341   Post-Acute Status   Post-Acute Authorization Other   Other Status No Post-Acute Service Needs   Discharge Delays None known at this time   Discharge Plan   Discharge Plan A Home       No post acute services required at this time      Khushi Hernandez CD, MSW, LMSW, RSW   Case Management  [facility]  Email: [email or web address]

## 2023-04-11 NOTE — ASSESSMENT & PLAN NOTE
- CT A/P with 4.9 cm right adnexal cystic lesion could represent a right ovarian cyst.  Cystic neoplasm is not excluded.   - will need OP gyn f/u

## 2023-04-11 NOTE — HPI
"83 F w/ PMH appendectomy, cholecystectomy (1990s) admitted with one day of acute onset diarrhea and abdominal pain prompting presentation.  Patient reports diarrhea is usually self-limited and happens once every several weeks.  Abdominal pain mid-epigastric/RUQ and similar to previous episodes of pain which will occasionally occur after her cholecystectomy.  She initially had episodes every several months but now they may happen once per year.  Denies fevers, chills, N/V.  Non-bloody diarrhea.  Pain improved since admission.  In ED, patient mildly hypotensive but otherwise stable vitals.  Afebrile.  Labs notable for Bili 1.4, AST 76, ALT 51.  Cr 0.8.  CT Abd/pelvis showign CBD measuring 23mm with intra and extrahepatic biliary dilation.  MRCP confirming "severe intra and extrahepatic biliary ductal dilation...no choledocholithiasis seen."      No therapeutic anticoagulation.    "

## 2023-04-11 NOTE — ANESTHESIA POSTPROCEDURE EVALUATION
Anesthesia Post Evaluation    Patient: Qiana Nettles    Procedure(s) Performed: Procedure(s) (LRB):  ULTRASOUND, UPPER GI TRACT, ENDOSCOPIC (N/A)  ERCP (ENDOSCOPIC RETROGRADE CHOLANGIOPANCREATOGRAPHY) (N/A)    Final Anesthesia Type: general      Patient location during evaluation: [facility]  Patient participation: Yes- Able to Participate  Level of consciousness: awake and alert and oriented  Post-procedure vital signs: reviewed and stable  Pain management: adequate  Airway patency: patent    PONV status at discharge: No PONV  Anesthetic complications: no      Cardiovascular status: blood pressure returned to baseline and hemodynamically stable  Respiratory status: unassisted, spontaneous ventilation and room air  Hydration status: euvolemic  Follow-up not needed.          Vitals Value Taken Time   /56 04/11/23 1445   Temp 36.7 °C (98 °F) 04/11/23 1430   Pulse 47 04/11/23 1451   Resp 30 04/11/23 1451   SpO2 80 % 04/11/23 1451   Vitals shown include unvalidated device data.      Event Time   Out of Recovery 14:54:52         Pain/Olayinka Score: Olayinka Score: 9 (4/11/2023  2:30 PM)

## 2023-04-11 NOTE — ASSESSMENT & PLAN NOTE
Elevated T bili  Transaminitis  82 yo F with PMHx of appendectomy and cholecytectomy who presented to the ED for 1 day of diarrhea with associated nausea weakness, and intermittent, sharp, mid abdominal pain.     - Afebrile  - T bili 1.4, AST 76, ALT 51   - Hep C negative  - CT A/P showing prominent dilation of the intrahepatic and extrahepatic biliary ducts with CBD measuring 2.3 cm. Concern for narrowing vs. stricture at the ampula.  - GI consulted; recommend MRCP and stool studies  - npo at midnight  - daily CMP

## 2023-04-11 NOTE — ANESTHESIA PREPROCEDURE EVALUATION
2023  Qiana Ntetles is a 83 y.o., female   Pre-operative evaluation for Procedure(s) (LRB):  ULTRASOUND, UPPER GI TRACT, ENDOSCOPIC (N/A)  ERCP (ENDOSCOPIC RETROGRADE CHOLANGIOPANCREATOGRAPHY) (N/A)    Qiana Nettles is a 83 y.o. female     Patient Active Problem List   Diagnosis    Diarrhea    Hypothyroidism    Intermittent asthma, uncomplicated    Osteopenia of hip    Total bilirubin, elevated    Transaminitis    Common bile duct dilatation    Right ovarian cyst       Review of patient's allergies indicates:  No Known Allergies    No current facility-administered medications on file prior to encounter.     Current Outpatient Medications on File Prior to Encounter   Medication Sig Dispense Refill    acetaminophen (TYLENOL) 500 MG tablet Take 1,000 mg by mouth daily as needed for Pain.      carboxymethylcellulose sodium (THERATEARS OPHT) Place 1 drop into both eyes daily as needed (Dry eye).      cyanocobalamin, vitamin B-12, (VITAMIN B-12 ORAL) Take 1 tablet by mouth once daily.      omega-3 fatty acids 1,000 mg Cap Take 2 g by mouth once daily.      UNABLE TO FIND Imodium instant (UK) - Take 2 tablets by mouth initially, then 1 tablet if needed thereafter for diarrhea.      vit A/vit C/vit E/zinc/copper (PRESERVISION AREDS ORAL) Take 1 capsule by mouth once daily.      ascorbic acid, vitamin C, (VITAMIN C) 500 MG tablet Take 500 mg by mouth.         Past Surgical History:   Procedure Laterality Date     SECTION      age 41    EYE SURGERY      age 8       CBC:   Recent Labs     04/10/23  1715 23  0430   WBC 7.68 4.68   RBC 4.36 3.72*   HGB 12.5 10.7*   HCT 39.7 34.6*    126*   MCV 91 93   MCH 28.7 28.8   MCHC 31.5* 30.9*       CMP:   Recent Labs     04/10/23  1715 23  0430    137   K 3.9 3.6    108   CO2 21* 21*   BUN 17 13   CREATININE 0.8 0.7   GLU  105 80   MG  --  1.8   CALCIUM 9.3 8.7   ALBUMIN 4.0 3.2*   PROT 7.1 5.6*   ALKPHOS 108 89   ALT 51* 49*   AST 76* 50*   BILITOT 1.4* 1.2*       Pre-op Assessment    I have reviewed the Patient Summary Reports.     I have reviewed the Nursing Notes. I have reviewed the NPO Status.   I have reviewed the Medications.     Review of Systems  Anesthesia Hx:  No problems with previous Anesthesia  History of prior surgery of interest to airway management or planning: Denies Family Hx of Anesthesia complications.   Denies Personal Hx of Anesthesia complications.   Social:  No Alcohol Use, Non-Smoker, Former Smoker    Hematology/Oncology:  Hematology Normal   Oncology Normal     EENT/Dental:EENT/Dental Normal   Cardiovascular:  Cardiovascular Normal Exercise tolerance: good     Pulmonary:   Asthma moderate    Renal/:  Renal/ Normal     Hepatic/GI:  Hepatic/GI Normal CBD dilation   Neurological:  Neurology Normal    Endocrine:   Hypothyroidism    Psych:  Psychiatric Normal           Physical Exam  General: Well nourished, Cooperative, Alert and Oriented    Airway:  Mallampati: III   Mouth Opening: Normal  TM Distance: Normal  Tongue: Normal  Neck ROM: Normal ROM    Dental:  Intact    Chest/Lungs:  Clear to auscultation, Normal Respiratory Rate    Heart:  Rate: Bradycardia  Rhythm: Regular Rhythm        Anesthesia Plan  Type of Anesthesia, risks & benefits discussed:    Anesthesia Type: Gen ETT, Gen Natural Airway  Intra-op Monitoring Plan: Standard ASA Monitors  Post Op Pain Control Plan: multimodal analgesia and IV/PO Opioids PRN  Induction:  IV  Airway Plan: Direct  Informed Consent: Informed consent signed with the Patient and all parties understand the risks and agree with anesthesia plan.  All questions answered. Patient consented to blood products? No  ASA Score: 3  Day of Surgery Review of History & Physical: H&P Update referred to the surgeon/provider.    Ready For Surgery From Anesthesia Perspective.     .

## 2023-04-11 NOTE — ASSESSMENT & PLAN NOTE
Patient with acute onset of recurrent self limited diarrhea.  Possibly related to history of cholecystectomy.    Recommendations:  -f/u c. Diff, stool culture, giardia/crypto, calprotectin

## 2023-04-11 NOTE — PLAN OF CARE
[facility]  Initial Discharge Assessment       Primary Care Provider: Yusef Anglin MD    Admission Diagnosis: Common bile duct dilatation [K83.8]  Chest pain [R07.9]  Abdominal pain, unspecified abdominal location [R10.9]  Diarrhea, unspecified type [R19.7]    Admission Date: 4/10/2023  Expected Discharge Date: 4/12/2023    Pt stated she is independent with her ambulation and ADL's and does not require equipment or assistance.    Pt lives in a two-story double with her son and daughter-in-law living next door.  Pt stated she is able to ambulate the stairs within the home independently.      Pt to d/c home with no needs when ready    Discharge Barriers Identified: (P) None    Payor: MEDICARE / Plan: MEDICARE PART A & B / Product Type: Government /     Extended Emergency Contact Information  Primary Emergency Contact: Mo Valverde  Address: [address] [place] [address] [address] [facility]  Home Phone: [phone]  Mobile Phone: [phone]  Relation: Son  Secondary Emergency Contact: Gema Nettles  Mobile Phone: [phone]  Relation: Other    Discharge Plan A: (P) Home  Discharge Plan B: (P) Home      [id number] [place], [place] - [address] [facility] [facility]  [address] [facility] [address]  Phone: [phone] Fax: [phone]      Initial Assessment (most recent)       Adult Discharge Assessment - 04/11/23 1336          Discharge Assessment    Assessment Type Discharge Planning Assessment (P)      Confirmed/corrected address, phone number and insurance Yes (P)      Confirmed Demographics Correct on Facesheet (P)      Source of Information patient (P)      Communicated SABIHA with patient/caregiver Yes (P)      Reason For Admission Common bile duct dilatation (P)      People in Home alone (P)      Facility Arrived From: home (P)      Do you expect to return to your current living situation? Yes (P)      Do you have help at home or someone to help you manage your care at  home? No (P)      Prior to hospitilization cognitive status: Alert/Oriented;No Deficits (P)      Current cognitive status: Alert/Oriented;No Deficits (P)      Home Accessibility stairs to enter home;stairs within home (P)      Number of Stairs, Within Home, Primary other (see comments) (P)    on fight 10-12 steps    Number of Stairs, Main Entrance four (P)      Home Layout Bathroom on 2nd floor;Bedroom on 2nd floor (P)      Equipment Currently Used at Home none (P)      Patient currently being followed by outpatient case management? No (P)      Do you currently have service(s) that help you manage your care at home? No (P)      Do you have any problems affording any of your prescribed medications? No (P)      Is the patient taking medications as prescribed? yes (P)      Who is going to help you get home at discharge? fifi Hassan and chanelle in law Ribeiro (P)      How do you get to doctors appointments? car, drives self (P)      Are you on dialysis? No (P)      Do you take coumadin? No (P)      Discharge Plan A Home (P)      Discharge Plan B Home (P)      DME Needed Upon Discharge  none (P)      Discharge Plan discussed with: Patient (P)      Discharge Barriers Identified None (P)                    Khushi Hernandez CD, MSW, LMSW, RSW   Case Management  [facility]  Email: [email or web address]

## 2023-04-11 NOTE — SUBJECTIVE & OBJECTIVE
Past Medical History:   Diagnosis Date    H/O eye surgery     at age 8 (two done)    History of appendectomy     age 34 or 36    History of removal of ovarian cyst     Hypothyroidism        Past Surgical History:   Procedure Laterality Date     SECTION      age 41    EYE SURGERY      age 8       Review of patient's allergies indicates:  No Known Allergies    Current Facility-Administered Medications on File Prior to Encounter   Medication    INV [id number]/Placebo 1x10(11) /mL injection 0.5 mL     Current Outpatient Medications on File Prior to Encounter   Medication Sig    albuterol (PROVENTIL/VENTOLIN HFA) 90 mcg/actuation inhaler Inhale 2 puffs into the lungs every 6 (six) hours as needed.    ascorbic acid, vitamin C, (VITAMIN C) 500 MG tablet Take 500 mg by mouth.    omega-3 fatty acids 1,000 mg Cap Take 2 g by mouth.    tretinoin (RETIN-A) 0.05 % cream APPLY TO AFFECTED AREA AS DIRECTED    valACYclovir (VALTREX) 1000 MG tablet Take 2,000 mg by mouth 2 (two) times daily as needed.    [DISCONTINUED] levothyroxine (SYNTHROID) 50 MCG tablet Take 50 mcg by mouth once daily.     Family History    None       Tobacco Use    Smoking status: Former     Types: Cigarettes     Quit date: 1968     Years since quittin.6    Smokeless tobacco: Never   Substance and Sexual Activity    Alcohol use: Yes     Comment: socially (champagne)    Drug use: No    Sexual activity: Not on file     Review of Systems   Constitutional:  Negative for activity change, chills and fever.   HENT:  Negative for trouble swallowing.    Eyes:  Negative for photophobia and visual disturbance.   Respiratory:  Negative for cough, chest tightness and shortness of breath.    Cardiovascular:  Negative for chest pain, palpitations and leg swelling.   Gastrointestinal:  Positive for abdominal pain, diarrhea and nausea. Negative for constipation and vomiting.   Genitourinary:  Negative for dysuria, frequency and hematuria.   Musculoskeletal:   Negative for back pain, gait problem and neck pain.   Skin:  Negative for rash and wound.   Neurological:  Positive for weakness. Negative for dizziness, syncope, speech difficulty and light-headedness.   Psychiatric/Behavioral:  Negative for agitation and confusion. The patient is not nervous/anxious.    Objective:     Vital Signs (Most Recent):  Temp: 97.4 °F (36.3 °C) (04/10/23 1653)  Pulse: 64 (04/10/23 2100)  Resp: 15 (04/10/23 2100)  BP: (!) 105/54 (04/10/23 2100)  SpO2: 99 % (04/10/23 2100)   Vital Signs (24h Range):  Temp:  [97.4 °F (36.3 °C)] 97.4 °F (36.3 °C)  Pulse:  [58-79] 64  Resp:  [14-20] 15  SpO2:  [97 %-100 %] 99 %  BP: ()/(51-54) 105/54     Weight: 54.4 kg (120 lb)  Body mass index is 18.25 kg/m².    Physical Exam  Vitals and nursing note reviewed.   Constitutional:       General: She is not in acute distress.     Appearance: She is well-developed. She is not ill-appearing.   HENT:      Head: Normocephalic and atraumatic.      Mouth/Throat:      Pharynx: No oropharyngeal exudate.   Eyes:      Conjunctiva/sclera: Conjunctivae normal.      Pupils: Pupils are equal, round, and reactive to light.   Cardiovascular:      Rate and Rhythm: Normal rate and regular rhythm.      Heart sounds: Normal heart sounds.   Pulmonary:      Effort: Pulmonary effort is normal. No respiratory distress.      Breath sounds: Normal breath sounds. No wheezing.   Abdominal:      General: Bowel sounds are normal. There is no distension.      Palpations: Abdomen is soft.      Tenderness: There is abdominal tenderness (mild, generalized but worse in RUQ).   Musculoskeletal:         General: No tenderness. Normal range of motion.      Cervical back: Normal range of motion and neck supple.   Lymphadenopathy:      Cervical: No cervical adenopathy.   Skin:     General: Skin is warm and dry.      Capillary Refill: Capillary refill takes less than 2 seconds.      Findings: No rash.   Neurological:      Mental Status: She is  alert and oriented to person, place, and time.      Cranial Nerves: No cranial nerve deficit.      Sensory: No sensory deficit.      Coordination: Coordination normal.   Psychiatric:         Behavior: Behavior normal.         Thought Content: Thought content normal.         Judgment: Judgment normal.         CRANIAL NERVES     CN III, IV, VI   Pupils are equal, round, and reactive to light.     Significant Labs: All pertinent labs within the past 24 hours have been reviewed.  CBC:   Recent Labs   Lab 04/10/23  1715   WBC 7.68   HGB 12.5   HCT 39.7        CMP:   Recent Labs   Lab 04/10/23  1715      K 3.9      CO2 21*      BUN 17   CREATININE 0.8   CALCIUM 9.3   PROT 7.1   ALBUMIN 4.0   BILITOT 1.4*   ALKPHOS 108   AST 76*   ALT 51*   ANIONGAP 11       Significant Imaging: I have reviewed all pertinent imaging results/findings within the past 24 hours.  Imaging Results               CT Abdomen Pelvis With Contrast (Final result)  Result time 04/10/23 21:35:13      Final result by Jerzy Oglesby MD (04/10/23 21:35:13)                   Impression:      1. Prominent dilation of the intrahepatic and extrahepatic biliary ducts.  The common duct measures 2.3 cm.  Narrowing or stricture at the ampulla is a consideration.  No obstructing lesion is detected.  A prominent postoperative change cannot be excluded.  2. Mild splenomegaly.  3. 4.9 cm right adnexal cystic lesion could represent a right ovarian cyst.  Cystic neoplasm is not excluded.  Gyn follow-up is recommended.  See above comments.  4. Diverticulosis of the sigmoid colon.  5. Nondistention of the stomach.  Mild mucosal thickening/gastritis cannot be excluded.  6. Hepatic cyst.  7. Status post cholecystectomy.  8.  This report was flagged in Epic as abnormal.      Electronically signed by: Jerzy Oglesby  Date:    04/10/2023  Time:    21:35               Narrative:    EXAMINATION:  CT ABDOMEN PELVIS WITH CONTRAST    CLINICAL  HISTORY:  Abdominal abscess/infection suspected;    TECHNIQUE:  Low dose axial images, sagittal and coronal reformations were obtained from the lung bases to the pubic symphysis following the IV administration of 75 mL of Omnipaque 350 .  Oral contrast was not administered.    COMPARISON:  None.    FINDINGS:  Abdomen:    - Lower thorax:    - Lung bases: No infiltrates and no nodules.    - Liver: 1.3 cm hepatic cyst in the anterior left lobe.    - Gallbladder: Status post cholecystectomy.    - Bile Ducts: There is prominent dilation of the intrahepatic and extrahepatic biliary ducts.  The common duct measures approximately 2.3 cm.  Narrowing or stricture at the ampulla is a consideration.  No obstructing lesion is identified.    - Spleen: Spleen is mildly enlarged.    - Kidneys: No stone, mass or hydronephrosis bilaterally.  Extrarenal pelvis configuration on the right.    - Adrenals: Unremarkable.    - Pancreas: No mass or peripancreatic fat stranding.  The pancreatic duct is within normal limits.    - Retroperitoneum:  No significant adenopathy.    - Vascular: No abdominal aortic aneurysm.    - Abdominal wall:  Unremarkable.    Pelvis:    Urinary bladder is within normal limits.    The uterus is present.    Bowel/Mesentery:    No evidence of bowel obstruction.  There is diverticulosis of the sigmoid colon.  No evidence of acute diverticulitis.    There is an enlarged 4.9 cm right adnexal ovoid homogeneous thin wall cystic lesion which could represent a right ovarian cyst.  Cystic neoplasm is not excluded.  Gyn follow-up is recommended.    Bones:  No acute fractures..    Stomach is nondistended.  Mild mucosal thickening/gastritis cannot be excluded.

## 2023-04-11 NOTE — ASSESSMENT & PLAN NOTE
Patient with history of cholecystectomy with acute onset of severe RUQ abdominal pain.  MRCP with evidence of marked biliary dilation without definitive choledocholithiasis.    Recommendations:  -EUS +/- ERCP today for further evaluation  -keep NPO  -hold anticoagulation as able  -trend CBC, CMP, INR  -monitor for signs of cholangitis

## 2023-04-11 NOTE — SUBJECTIVE & OBJECTIVE
Past Medical History:   Diagnosis Date    H/O eye surgery     at age 8 (two done)    History of appendectomy     age 34 or 36    History of removal of ovarian cyst     Hypothyroidism        Past Surgical History:   Procedure Laterality Date     SECTION      age 41    EYE SURGERY      age 8       Review of patient's allergies indicates:  No Known Allergies  Family History    None       Tobacco Use    Smoking status: Former     Types: Cigarettes     Quit date: 1968     Years since quittin.6    Smokeless tobacco: Never   Substance and Sexual Activity    Alcohol use: Yes     Comment: socially (champagne)    Drug use: No    Sexual activity: Not on file     Review of Systems   Constitutional:  Negative for fatigue and fever.   Gastrointestinal:  Positive for abdominal pain and diarrhea. Negative for blood in stool.   Objective:     Vital Signs (Most Recent):  Temp: 97.6 °F (36.4 °C) (23)  Pulse: (!) 55 (23)  Resp: 16 (23)  BP: (!) 104/53 (23)  SpO2: 99 % (23)   Vital Signs (24h Range):  Temp:  [97.4 °F (36.3 °C)-99.7 °F (37.6 °C)] 97.6 °F (36.4 °C)  Pulse:  [54-79] 55  Resp:  [14-20] 16  SpO2:  [97 %-100 %] 99 %  BP: ()/(51-54) 104/53     Weight: 54.4 kg (120 lb) (04/10/23 1653)  Body mass index is 18.25 kg/m².      Intake/Output Summary (Last 24 hours) at 2023 0838  Last data filed at 4/10/2023 2028  Gross per 24 hour   Intake 1000 ml   Output --   Net 1000 ml       Lines/Drains/Airways       Peripheral Intravenous Line  Duration                  Peripheral IV - Single Lumen 04/10/23 1715 20 G Right Antecubital <1 day                    Physical Exam    Gen: NAD, lying comfortably  HENT: NCAT, oropharynx clear  Eyes: anicteric sclerae, EOMI grossly  Abd: soft, NT/ND, no rebound  Ext: no LE edema, warm, well perfused  Skin: skin intact on exposed body parts, no visible rashes, lesions  Neuro: A&Ox4, neuro exam grossly intact, moves all  extremities  Psych: appropriate mood, affect      Significant Labs:  CBC:   Recent Labs   Lab 04/10/23  1715 04/11/23  0430   WBC 7.68 4.68   HGB 12.5 10.7*   HCT 39.7 34.6*    126*     CMP:   Recent Labs   Lab 04/11/23  0430   GLU 80   CALCIUM 8.7   ALBUMIN 3.2*   PROT 5.6*      K 3.6   CO2 21*      BUN 13   CREATININE 0.7   ALKPHOS 89   ALT 49*   AST 50*   BILITOT 1.2*     Coagulation: No results for input(s): PT, INR, APTT in the last 48 hours.    Significant Imaging:  Imaging results within the past 24 hours have been reviewed.

## 2023-04-11 NOTE — ASSESSMENT & PLAN NOTE
- 1 day history of diarrhea. Denies recent abx use  - BP soft in ED. Given 1L LR; will continue IVFs overnight  - CMP without electrolyte abnormalities  - GI consulted  - stool studies and C.diff pending

## 2023-04-11 NOTE — TREATMENT PLAN
Brief GI treatment plan    EUS performed today.  Findings:      Impression:            - There was no evidence of significant pathology                          in the visualized portion of the liver.                          - There was dilation in the common bile duct which                          measured up to 16.4 mm. Endosonographic imaging in                          the common bile duct showed no stones, sludge,                          mass or stricture.                          - There was no sign of significant pathology in                          the ampulla.                          - No specimens collected.   Recommendation:        - Return patient to [facility] for ongoing care.                          - Observe patient's clinical course.                          - Return to ERCP clinic in 1 month.     - will arrange f/u in GI clinic          Please see full endoscopy report for details.      GI will sign off.  Please call questions.    Braden Calhoun MD  GI Fellow

## 2023-04-12 ENCOUNTER — TELEPHONE (OUTPATIENT)
Dept: ENDOSCOPY | Facility: HOSPITAL | Age: 83
End: 2023-04-12
Payer: MEDICARE

## 2023-04-12 NOTE — HOSPITAL COURSE
Qiana Nettles is a 83 y.o. female admitted to observation for common bile duct dilation. Diarrhea resolved. MRCP reviewed. [facility] consulted. EUS performed w/o acute findings. Will follow up in GI clinic and Endo in 1 month. Patient is medically ready for discharge home. All questions answered at bedside. Return precautions given.

## 2023-04-12 NOTE — DISCHARGE SUMMARY
"[facility] - Emergency Dept  Hospital Medicine  Discharge Summary      Patient Name: Qiana Nettles  MRN: [id number]  MARIEL: [id number]  Patient Class: OP- Observation  Admission Date: 4/10/2023  Hospital Length of Stay: 0 days  Discharge Date and Time: 4/11/2023  5:32 PM  Attending Physician: No att. providers found   Discharging Provider: Sarahy Gonzalez PA-C  Primary Care Provider: Yusef Anglin MD  Hospital Medicine Team: [facility] Sarahy Gonzalez PA-C  Primary Care Team: [facility]    HPI:   Qiana Nettles is a 82 yo F with PMHx of appendectomy and cholecytectomy (1990s) who presented to the ED for diarrhea. Patient reports diarrhea started at 5am this morning and has progressively worsened throughout the day. Reports ~10 episodes of watery, non bloody diarrhea. Last episode around 3pm. Also admits associated nausea weakness, and intermittent, sharp, 10/10 mid abdominal pain. She took 2 doses of imodium at home. States that abdominal pain is improving and describes it as 2/10 "abdominal soreness". Denies recent antibiotic use or recent hospitalizations. Reports that she has had similar episodes in the past that began after her cholecystectomy, but that she has not had an episode like this in many years. Symptoms usually only lasted one day.  Denies fever, chills, chest pain, palpitations, SOB, cough, abdominal pain, n/v/d, dysuria, headaches and LE swelling.    In ED: Afebrile. Intermittently bradycardic with HR in the high 50s. Hypotensive to 97/53. Given 1L LR. CBC without leukocytosis or anemia. CMP without electrolyte abnormalities, but with T bili 1.4 and transaminits. CT A/P showing prominent dilation of the intrahepatic and extrahepatic biliary ducts and concern for narrowing vs. stricture at the ampula. ED provider discussed with GI. GI recommending MRCP tomorrow and stool studies.       Procedure(s) (LRB):  ULTRASOUND, UPPER GI TRACT, ENDOSCOPIC (N/A)  ERCP (ENDOSCOPIC RETROGRADE " CHOLANGIOPANCREATOGRAPHY) (N/A)      Hospital Course:   Qiana Nettles is a 83 y.o. female admitted to observation for common bile duct dilation. Diarrhea resolved. MRCP reviewed. AES consulted. EUS performed w/o acute findings. Will follow up in GI clinic and Endo in 1 month. Patient is medically ready for discharge home. All questions answered at bedside. Return precautions given.        Goals of Care Treatment Preferences:  Code Status: Full Code      Consults:   Consults (From admission, onward)        Status Ordering Provider     Inpatient consult to [facility] ([facility])  Once        Provider:  (Not yet assigned)    KRISTAN Araujo          Final Active Diagnoses:    Diagnosis Date Noted POA    PRINCIPAL PROBLEM:  Common bile duct dilatation [K83.8] 04/10/2023 Yes    Diarrhea [R19.7] 04/10/2023 Yes    Total bilirubin, elevated [R17] 04/10/2023 Yes    Transaminitis [R74.01] 04/10/2023 Yes    Right ovarian cyst [N83.201] 04/10/2023 Yes      Problems Resolved During this Admission:       Discharged Condition: stable    Disposition: Home or Self Care    Follow Up:    Patient Instructions:      Ambulatory referral/consult to Gastroenterology   Standing Status: Future   Referral Priority: Routine Referral Type: Consultation   Referral Reason: Specialty Services Required   Requested Specialty: Gastroenterology   Number of Visits Requested: 1     Activity as tolerated       Pending Diagnostic Studies:     None         Medications:  Reconciled Home Medications:      Medication List      CONTINUE taking these medications    acetaminophen 500 MG tablet  Commonly known as: TYLENOL  Take 1,000 mg by mouth daily as needed for Pain.     ascorbic acid (vitamin C) 500 MG tablet  Commonly known as: VITAMIN C  Take 500 mg by mouth.     omega-3 fatty acids 1,000 mg Cap  Take 2 g by mouth once daily.     PRESERVISION AREDS ORAL  Take 1 capsule by mouth once daily.     THERATEARS OPHT  Place 1 drop into  both eyes daily as needed (Dry eye).     UNABLE TO FIND  Imodium instant (UK) - Take 2 tablets by mouth initially, then 1 tablet if needed thereafter for diarrhea.     VITAMIN B-12 ORAL  Take 1 tablet by mouth once daily.            Indwelling Lines/Drains at time of discharge:   Lines/Drains/Airways     None                 Time spent on the discharge of patient: 36 minutes         Sarahy Gonzalez PA-C  Department of Hospital Medicine  [facility] - Emergency Dept

## 2023-04-13 NOTE — TELEPHONE ENCOUNTER
----- Message from Braden Calhoun MD sent at 4/11/2023  3:37 PM CDT -----  Regarding: f/u in AES and GI clinic  Patient will need f/u in AES clinic in 1 month for biliary dilation.  F/u in GI clinic at some point for recurrent diarrhea

## 2023-05-25 ENCOUNTER — OFFICE VISIT (OUTPATIENT)
Dept: GASTROENTEROLOGY | Facility: CLINIC | Age: 83
End: 2023-05-25
Payer: MEDICARE

## 2023-05-25 VITALS
WEIGHT: 117.63 LBS | DIASTOLIC BLOOD PRESSURE: 73 MMHG | HEIGHT: 68 IN | HEART RATE: 71 BPM | SYSTOLIC BLOOD PRESSURE: 111 MMHG | BODY MASS INDEX: 17.83 KG/M2

## 2023-05-25 DIAGNOSIS — R10.9 ABDOMINAL PAIN, UNSPECIFIED ABDOMINAL LOCATION: ICD-10-CM

## 2023-05-25 PROCEDURE — 99999 PR PBB SHADOW E&M-EST. PATIENT-LVL III: ICD-10-PCS | Mod: PBBFAC,,, | Performed by: INTERNAL MEDICINE

## 2023-05-25 PROCEDURE — 99214 OFFICE O/P EST MOD 30 MIN: CPT | Mod: S$PBB,,, | Performed by: INTERNAL MEDICINE

## 2023-05-25 PROCEDURE — 99214 PR OFFICE/OUTPT VISIT, EST, LEVL IV, 30-39 MIN: ICD-10-PCS | Mod: S$PBB,,, | Performed by: INTERNAL MEDICINE

## 2023-05-25 PROCEDURE — 99213 OFFICE O/P EST LOW 20 MIN: CPT | Mod: PBBFAC | Performed by: INTERNAL MEDICINE

## 2023-05-25 PROCEDURE — 99999 PR PBB SHADOW E&M-EST. PATIENT-LVL III: CPT | Mod: PBBFAC,,, | Performed by: INTERNAL MEDICINE

## 2023-05-25 RX ORDER — AMOXICILLIN 500 MG/1
500 CAPSULE ORAL EVERY 6 HOURS
COMMUNITY
Start: 2023-05-17

## 2023-05-25 NOTE — PROGRESS NOTES
"    [facility] / [facility]    Reason for visit (Chief Complaint): F/up for dilated CBD on imaging s/p CT, MRCP and EUS without any pathology detected (likely related to remote cholecystectomy)    Referring provider/PCP: Aaareferral Self  No address on file    History of Present Illness: Qiana Nettles is an 83 female with a h/o appendectomy, cholecystectomy (1990s) who was about 5 weeks ago admitted with one day of acute onset diarrhea and abdominal pain prompting an inpatient eval of dilated CBD in setting of prior cholecystectomy, detected on initial CT. Patient reports diarrhea is usually self-limited and happens once every several weeks.  Abdominal pain mid-epigastric/RUQ and similar to previous episodes of pain which will occasionally occur after her cholecystectomy.  She initially had episodes every several months but now they may happen once per year.  Denies fevers, chills, N/V.  Non-bloody diarrhea.  Pain improved shortly after admission.  Labs notable for Bili 1.4 which downtrended to 1.2, AST 76 down to 50, ALT 51 down to 49.  Cr 0.8.  CT Abd/pelvis showign CBD measuring 23mm with intra and extrahepatic biliary dilation.  MRCP confirming "severe intra and extrahepatic biliary ductal dilation...no choledocholithiasis seen."    EUS was then done on 4/11/23 without any evidence of CBD pathology outside of dilation of about 16mm. No stones, sludge, mass or stricture, which was all very reassuring.    Since her discharge, she states that she is feeling well without any current abdominal complaints in clinic today.  She does experience some intermittent postprandial abdominal discomfort, which responds remarkably well to Tums.  She wants to continue taking Tums.  She does not want to start a prescription antacid medicine after I had offered potentially starting her on a PPI to reduce acid exposure.  I think this is reasonable at this moment as well.  She states that she would like to gain " some more weight and has been following a fairly bland, low-fat diet as recommended by 1 of the providers from her hospitalization.  She asked if she could add back avocado and peanut butter in moderation, and I felt that this was okay as long as she also listen to her body and to do this in moderation only.  She states that her weight is around 117 lb right now where as her typical weight is about 125 lb.  She does not endorse ongoing weight loss at this moment reporting stable weight.    We reviewed her findings regarding her dilated bile duct without any pathology noted as well as state of her liver upon her request.  There was a small hepatic cyst that appeared to be a simple cyst measuring about 13 mm on CT.  There was otherwise no evidence of liver parenchymal abnormalities on both CT and MRCP.    Offered to monitor her liver tests with a CMP lab test today, and she respectfully declined since she is feeling well, which I also think is understandable.    Discussed how her bile duct has been evaluated with all the potential modalities that are least invasive, and nothing has been detected outside of chronic dilation, which is likely related to previous remote cholecystectomy.      Past Medical History:   Diagnosis Date    H/O eye surgery     at age 8 (two done)    History of appendectomy     age 34 or 36    History of removal of ovarian cyst     Hypothyroidism        Past Surgical History:   Procedure Laterality Date     SECTION      age 41    ENDOSCOPIC ULTRASOUND OF UPPER GASTROINTESTINAL TRACT N/A 2023    Procedure: ULTRASOUND, UPPER GI TRACT, ENDOSCOPIC;  Surgeon: Matt Gregorio MD;  Location: [address]);  Service: Endoscopy;  Laterality: N/A;    ERCP N/A 2023    Procedure: ERCP (ENDOSCOPIC RETROGRADE CHOLANGIOPANCREATOGRAPHY);  Surgeon: Matt Gregorio MD;  Location: [address]);  Service: Endoscopy;  Laterality: N/A;    EYE SURGERY      age 8       History  reviewed. No pertinent family history.    Social History     Socioeconomic History    Marital status:    Tobacco Use    Smoking status: Former     Types: Cigarettes     Quit date: 1968     Years since quittin.7    Smokeless tobacco: Never   Substance and Sexual Activity    Alcohol use: Yes     Comment: socially (champagne)    Drug use: No    Sexual activity: Not Currently       ROS otherwise unremarkable outside of the aforementioned symptoms in HPI.    Vitals:    23 1033   BP: 111/73   Pulse: 71         Physical Exam:  General: Well-developed, well-appearing, no acute distress  Neuro: alert and oriented to person, place, time; normal appearing gait  Eyes: No scleral icterus  Abdomen: soft, non-distended, non-tender, no rebound tenderness or guarding      Laboratory:   Reviewed in chart/records and relevant findings are summarized above in HPI.    Imaging:  Reviewed in chart/records and relevant findings are summarized above in HPI.      Assessment/Plan:      # F/up for dilated CBD on imaging s/p CT, MRCP and EUS without any pathology detected (likely related to remote cholecystectomy)  # Intermittent postprandial epigastric and lower abdominal discomfort that responds well to Tums  # Previous intermittent diarrhea, currently not bothersome    Plan:  - reviewed in detail her workup and imaging findings from her hospitalization which included CT, MRCP and EUS, all of which were reassuring without any pathology detected as an explanation for her dilated bile duct.  Specifically, no mass, stricture, stone or sludge was seen within the bile duct or as a cause of biliary dilation.  Discussed how this is likely related to post cholecystectomy state.  Unlikely to be related to her postprandial abdominal complaints that respond to Tums.  - okay to continue taking Tums for her discomfort, which is her preference.  Offered to trial a low dose once daily PPI to see if this adds benefit, to which she  respectfully declined  - okay to start adding back some dietary items she is found of such as avocado, peanut butter in moderation  - no further role for imaging or procedural testing for her dilated CBD at this time  - refer to General GI Clinic to review her chronic GI complaints and establish care for if they ever have flares requiring an adjustment to her management         Daron West MD  [facility] [place]

## 2023-05-30 ENCOUNTER — PATIENT MESSAGE (OUTPATIENT)
Dept: ENDOSCOPY | Facility: HOSPITAL | Age: 83
End: 2023-05-30
Payer: MEDICARE

## 2023-07-05 ENCOUNTER — PATIENT MESSAGE (OUTPATIENT)
Dept: ENDOSCOPY | Facility: HOSPITAL | Age: 83
End: 2023-07-05
Payer: MEDICARE

## 2023-07-05 ENCOUNTER — TELEPHONE (OUTPATIENT)
Dept: ENDOSCOPY | Facility: HOSPITAL | Age: 83
End: 2023-07-05
Payer: MEDICARE

## 2023-07-17 ENCOUNTER — OFFICE VISIT (OUTPATIENT)
Dept: GASTROENTEROLOGY | Facility: CLINIC | Age: 83
End: 2023-07-17
Payer: MEDICARE

## 2023-07-17 VITALS
SYSTOLIC BLOOD PRESSURE: 99 MMHG | HEART RATE: 79 BPM | DIASTOLIC BLOOD PRESSURE: 64 MMHG | BODY MASS INDEX: 17.44 KG/M2 | HEIGHT: 69 IN | WEIGHT: 117.75 LBS

## 2023-07-17 DIAGNOSIS — R19.7 DIARRHEA, UNSPECIFIED TYPE: Primary | ICD-10-CM

## 2023-07-17 DIAGNOSIS — D64.9 ANEMIA, UNSPECIFIED TYPE: ICD-10-CM

## 2023-07-17 PROCEDURE — 99213 OFFICE O/P EST LOW 20 MIN: CPT | Mod: S$PBB,GC,, | Performed by: STUDENT IN AN ORGANIZED HEALTH CARE EDUCATION/TRAINING PROGRAM

## 2023-07-17 PROCEDURE — 99213 PR OFFICE/OUTPT VISIT, EST, LEVL III, 20-29 MIN: ICD-10-PCS | Mod: S$PBB,GC,, | Performed by: STUDENT IN AN ORGANIZED HEALTH CARE EDUCATION/TRAINING PROGRAM

## 2023-07-17 PROCEDURE — 99999 PR PBB SHADOW E&M-EST. PATIENT-LVL III: ICD-10-PCS | Mod: PBBFAC,GC,, | Performed by: STUDENT IN AN ORGANIZED HEALTH CARE EDUCATION/TRAINING PROGRAM

## 2023-07-17 PROCEDURE — 99213 OFFICE O/P EST LOW 20 MIN: CPT | Mod: PBBFAC | Performed by: STUDENT IN AN ORGANIZED HEALTH CARE EDUCATION/TRAINING PROGRAM

## 2023-07-17 PROCEDURE — 99999 PR PBB SHADOW E&M-EST. PATIENT-LVL III: CPT | Mod: PBBFAC,GC,, | Performed by: STUDENT IN AN ORGANIZED HEALTH CARE EDUCATION/TRAINING PROGRAM

## 2023-07-17 NOTE — PROGRESS NOTES
"GENERAL GI PATIENT INTAKE:    COVID symptoms in the last 7 days (runny nose, sore throat, congestion, cough, fever): No  PCP: Yusef Anglin  If not PCP-  number given to establish [phone]: Yes    ALLERGIES REVIEWED:  Yes    CHIEF COMPLAINT:    Chief Complaint   Patient presents with    Follow-up     Seen by    between April and March     Abdominal Cramping    Abdominal Pain       VITAL SIGNS:  BP 99/64   Pulse 79   Ht 5' 9" (1.753 m)   Wt 53.4 kg (117 lb 11.6 oz)   BMI 17.39 kg/m²      Change in medical, surgical, family or social history: Yes      REVIEWED MEDICATION LIST RECONCILED INCLUDING ABOVE MEDS:  Yes        "

## 2023-07-17 NOTE — PROGRESS NOTES
[facility]    Reason for visit: The primary encounter diagnosis was Diarrhea, unspecified type. A diagnosis of Anemia, unspecified type was also pertinent to this visit.  Referring Provider/PCP: Yusef Anglin MD    History of Present Illness:  Qiana Nettles is a 83 y.o. female with a history of appendectomy, cholecystectomy 1990s who is presenting for diarrhea.     Recently admitted for diarrhea and abd pain. AES was consulted for dilated CBD. EUS showed benign dilation, no stones, sludge, mass, or stricture. Saw [facility] in clinic afterward, was doing well other than mild postprandial abdominal pain relieved with Tums. Declined PPI. Diarrhea was resolved at that time 2 months ago.     She reports diarrhea is chronic issue, started 1-2 years ago. Typically 1-2 stools per day but has episodes of 6-7 loose stools in a day with urgency. Denies nocturnal stools. Nonbloody. Has intermittent LLQ pain but unrelated to food or BMs. Follows a bland diet since this was recommended at hospitalization recently, feels like this does help somewhat. Feels like stool often float on surface of water when she is having diarrhea.     Reports weight loss but does not weigh herself at home, but she is concerned that she weighs same as 3 months ago while still wearing jacket, purse and shoes.     Denies FH of colorectal cancer    Vitals:    07/17/23 1612   BP: 99/64   Pulse: 79         Physical Exam:  Constitutional:  not in acute distress and well developed  HENT: Head: Normal, normocephalic, atraumatic.  Eyes: conjunctiva clear and sclera nonicteric  GI: soft, non-tender, without masses or organomegaly  Skin: normal color  Neurological: alert, oriented x3  Psychiatric: mood and affect are within normal limits, pt is a good historian; no memory problems were noted    Laboratory:  Reviewed labs ordered by another provider:   Mild normocytic anemia Hgb 10.7 MCV 93 on 4/11/32, iron studies not available  Cr 0.7  LFTs  mildly elevated, Tbili 1.2 AST 50 ALT 49 on 4/11/23.     Imaging:  No pertinent imaging.    Endoscopy:  Colonoscopy at age 76, done outside [facility], suspect with [facility]. Was told she can stop screening.    Assessment:  Qiana Nettles is a 83 y.o. female who is presenting for initial evaluation of chronic episodic diarrhea. Onset within past 2-3 years. Associated with subjective weight loss, mild recent normocytic anemia. Will start with noninvasive investigation with repeat CBC, iron studies and stool studies. If unrevealing and/or iron deficiency anemia is present, would proceed with colonoscopy. Can take loperamide prn during episodes for symptom control.    Problems:  Chronic intermittent diarrhea  Normocytic anemia    Plan:  Check CBC, iron studies  Check stool studies: c diff, stool culture, calprotectin, fecal elastase, fecal fat qualitative  If XIMENA present or stool studies unrevealing, will likely proceed with colonoscopy  Prn loperamide for now. Consider trial of cholestyramine in the future if workup is unrevealing.  Follow up in about 2 months (around 9/17/2023).    Plan of care discussed with attending Dr. May.    Javi Cardenas MD  Gastroenterology Fellow    Orders Placed This Encounter   Procedures    Stool culture    Clostridium difficile EIA    CBC Auto Differential    Calprotectin, Stool    Giardia / Cryptosporidum, EIA    IRON AND TIBC    Ferritin    Pancreatic elastase, fecal    Fecal fat, qualitative

## 2023-07-18 ENCOUNTER — LAB VISIT (OUTPATIENT)
Dept: LAB | Facility: HOSPITAL | Age: 83
End: 2023-07-18
Payer: MEDICARE

## 2023-07-18 DIAGNOSIS — R19.7 DIARRHEA, UNSPECIFIED TYPE: ICD-10-CM

## 2023-07-18 LAB
C DIFF GDH STL QL: NEGATIVE
C DIFF TOX A+B STL QL IA: NEGATIVE

## 2023-07-18 PROCEDURE — 83993 ASSAY FOR CALPROTECTIN FECAL: CPT | Performed by: STUDENT IN AN ORGANIZED HEALTH CARE EDUCATION/TRAINING PROGRAM

## 2023-07-18 PROCEDURE — 82653 EL-1 FECAL QUANTITATIVE: CPT | Performed by: STUDENT IN AN ORGANIZED HEALTH CARE EDUCATION/TRAINING PROGRAM

## 2023-07-18 PROCEDURE — 87427 SHIGA-LIKE TOXIN AG IA: CPT | Performed by: STUDENT IN AN ORGANIZED HEALTH CARE EDUCATION/TRAINING PROGRAM

## 2023-07-18 PROCEDURE — 87449 NOS EACH ORGANISM AG IA: CPT | Mod: 91 | Performed by: STUDENT IN AN ORGANIZED HEALTH CARE EDUCATION/TRAINING PROGRAM

## 2023-07-18 PROCEDURE — 87045 FECES CULTURE AEROBIC BACT: CPT | Performed by: STUDENT IN AN ORGANIZED HEALTH CARE EDUCATION/TRAINING PROGRAM

## 2023-07-18 PROCEDURE — 87329 GIARDIA AG IA: CPT | Performed by: STUDENT IN AN ORGANIZED HEALTH CARE EDUCATION/TRAINING PROGRAM

## 2023-07-18 PROCEDURE — 87046 STOOL CULTR AEROBIC BACT EA: CPT | Performed by: STUDENT IN AN ORGANIZED HEALTH CARE EDUCATION/TRAINING PROGRAM

## 2023-07-18 PROCEDURE — 87449 NOS EACH ORGANISM AG IA: CPT | Performed by: STUDENT IN AN ORGANIZED HEALTH CARE EDUCATION/TRAINING PROGRAM

## 2023-07-18 PROCEDURE — 82705 FATS/LIPIDS FECES QUAL: CPT | Performed by: STUDENT IN AN ORGANIZED HEALTH CARE EDUCATION/TRAINING PROGRAM

## 2023-07-19 LAB
CRYPTOSP AG STL QL IA: NEGATIVE
E COLI SXT1 STL QL IA: NEGATIVE
E COLI SXT2 STL QL IA: NEGATIVE
G LAMBLIA AG STL QL IA: NEGATIVE

## 2023-07-20 LAB — BACTERIA STL CULT: NORMAL

## 2023-07-21 LAB
ELASTASE 1, FECAL: 303 MCG/G
FAT STL QL: NORMAL
NEUTRAL FAT STL QL: NORMAL

## 2023-07-25 ENCOUNTER — PATIENT MESSAGE (OUTPATIENT)
Dept: GASTROENTEROLOGY | Facility: CLINIC | Age: 83
End: 2023-07-25
Payer: MEDICARE

## 2023-07-25 LAB — CALPROTECTIN STL-MCNT: <27.1 MCG/G

## 2023-07-25 RX ORDER — CHOLESTYRAMINE 4 G/9G
4 POWDER, FOR SUSPENSION ORAL
Qty: 30 PACKET | Refills: 11 | Status: SHIPPED | OUTPATIENT
Start: 2023-07-25 | End: 2024-07-19

## 2023-07-25 NOTE — TELEPHONE ENCOUNTER
Discussed results with patient. Stool testing all normal, no evidence of malabsorption or infection. Anemia no longer present. Will start with trial of cholestyramine 4g daily for possible bile acid diarrhea. Advised she avoid taking other medications one hour before cholestyramine or 4 hours after as it can interfere with absorption of other medications. She understands and is amenable to this plan.    Discussed plan of care with attending Dr. Lalo Cardenas  Gastroenterology and Hepatology Fellow, PGY-V

## 2024-04-29 ENCOUNTER — TELEPHONE (OUTPATIENT)
Dept: GASTROENTEROLOGY | Facility: CLINIC | Age: 84
End: 2024-04-29
Payer: MEDICARE

## 2024-04-29 DIAGNOSIS — R19.7 DIARRHEA, UNSPECIFIED TYPE: Primary | ICD-10-CM

## 2024-04-29 NOTE — TELEPHONE ENCOUNTER
Cholestyramine  She says that it was helping until 1 month ago.  At that time, she was placed on an ABX. Since that time, she says that the cholestyramine has not been helping.

## 2024-04-29 NOTE — TELEPHONE ENCOUNTER
----- Message from Tamy Yates sent at 4/29/2024 12:18 PM CDT -----  Regarding: Pt needs to sche an appt none avail  Contact: [phone]  Name of Who is Calling:CANDACE CASSIDY [[id number]]        What is the request in detail:Pt called states she  needs to sche an appt none avail. Please advise         Can the clinic reply by [facility]:No        What Number to Call Back if not in [facility]: Telephone Information:  Mobile          [phone]

## 2024-04-30 NOTE — TELEPHONE ENCOUNTER
Discussed with patient. Previously diarrhea was well controlled with cholestyramine, but she took amoxicillin for a cold, finished about 2 weeks ago. She has since developed diarrhea up to 8-9 times per day associated with frequency. Passing loose stools and mucus. Persisted for 2 weeks despite stopping amoxicillin.     Discussed that it would be worthwhile to rule out cdiff since she just received antibiotics. Ordered cdiff testing. She can collect a stool collection kit from our clinic  which she plans to do tomorrow.    Javi Cardenas  Gastroenterology and Hepatology Fellow, PGY-V

## 2024-05-02 ENCOUNTER — LAB VISIT (OUTPATIENT)
Dept: LAB | Facility: HOSPITAL | Age: 84
End: 2024-05-02
Payer: MEDICARE

## 2024-05-02 DIAGNOSIS — R19.7 DIARRHEA, UNSPECIFIED TYPE: ICD-10-CM

## 2024-05-02 LAB
C DIFF GDH STL QL: POSITIVE
C DIFF TOX A+B STL QL IA: NEGATIVE
C DIFF TOX GENS STL QL NAA+PROBE: POSITIVE

## 2024-05-02 PROCEDURE — 87449 NOS EACH ORGANISM AG IA: CPT | Performed by: STUDENT IN AN ORGANIZED HEALTH CARE EDUCATION/TRAINING PROGRAM

## 2024-05-02 PROCEDURE — 87493 C DIFF AMPLIFIED PROBE: CPT | Performed by: STUDENT IN AN ORGANIZED HEALTH CARE EDUCATION/TRAINING PROGRAM

## 2024-05-03 ENCOUNTER — PATIENT MESSAGE (OUTPATIENT)
Dept: HEPATOLOGY | Facility: HOSPITAL | Age: 84
End: 2024-05-03
Payer: MEDICARE

## 2024-05-03 DIAGNOSIS — A04.72 C. DIFFICILE COLITIS: Primary | ICD-10-CM

## 2024-05-03 RX ORDER — VANCOMYCIN HYDROCHLORIDE 125 MG/1
125 CAPSULE ORAL 4 TIMES DAILY
Qty: 56 CAPSULE | Refills: 0 | Status: SHIPPED | OUTPATIENT
Start: 2024-05-03

## 2024-05-03 NOTE — TELEPHONE ENCOUNTER
Cdiff testing is positive. Discussed with patient. Will treat with PO vancomycin for 14 days. Rx sent to her preferred pharmacy. Advised if she notices fevers, chills, poor PO intake or decreased urine output she should present to ED. She understands and is thankful for the call.    Javi Cardenas  Gastroenterology and Hepatology Fellow, PGY-V

## 2024-05-21 ENCOUNTER — TELEPHONE (OUTPATIENT)
Dept: GASTROENTEROLOGY | Facility: CLINIC | Age: 84
End: 2024-05-21
Payer: MEDICARE

## 2024-05-21 DIAGNOSIS — R19.7 DIARRHEA, UNSPECIFIED TYPE: Primary | ICD-10-CM

## 2024-05-21 NOTE — TELEPHONE ENCOUNTER
----- Message from Tamy Yates sent at 5/21/2024  9:25 AM CDT -----  Regarding: Pt called states shes taking all the antibiotics and still having the same issues  Contact: [phone]  Name of Who is Calling:   CANDACE CASSIDY [[id number]]     What is the request in detail:Pt called states shes taking all the antibiotics and still having the same issues. States she leaves to go out of town on Thursday and is unable to cancel. Please advise         Can the clinic reply by [facility]:No        What Number to Call Back if not in [facility]: Telephone Information:  Mobile          [phone]

## 2024-05-22 ENCOUNTER — TELEPHONE (OUTPATIENT)
Dept: GASTROENTEROLOGY | Facility: CLINIC | Age: 84
End: 2024-05-22
Payer: MEDICARE

## 2024-05-22 RX ORDER — VANCOMYCIN HYDROCHLORIDE 125 MG/1
CAPSULE ORAL
Qty: 58 CAPSULE | Refills: 0 | Status: SHIPPED | OUTPATIENT
Start: 2024-05-22 | End: 2024-05-22

## 2024-05-22 RX ORDER — VANCOMYCIN HYDROCHLORIDE 125 MG/1
CAPSULE ORAL
Qty: 58 CAPSULE | Refills: 0 | Status: SHIPPED | OUTPATIENT
Start: 2024-05-22

## 2024-05-22 NOTE — TELEPHONE ENCOUNTER
Spoke with patient. Had some improvement while taking PO vancomycin but 2-3 days after finishing the course, diarrhea worsened again. Now having about 8 stools per day again. Will recheck stool for cdiff and obtain fecal calprotectin as well.    Javi Cardenas  Gastroenterology and Hepatology Fellow, PGY-V

## 2024-05-22 NOTE — TELEPHONE ENCOUNTER
----- Message from Nicole Merritt sent at 5/22/2024  3:45 PM CDT -----  Regarding: Medication Pharmacy Issue  Contact: [phone]  Is this a Refill or New Rx (Script/Out of Refills):  New RX    Name of Caller: Patient     Rx Name: vancomycin (VANCOCIN) 125 MG capsule    Pharmacy Name:   [facility]  [address], [place], [address]  Phone Number: [phone]    Additional Information: Patient is stating that previous pharmacy medication was sent to doesn't have medication. Pt would like medication to sent to [facility] as soon as possible. Pt states she leaves to go out of town tomorrow morning at 7am and would like to  medication on the way to airport.

## 2024-05-22 NOTE — TELEPHONE ENCOUNTER
Prescribed vanc taper in case cdiff testing is positive. She leaves tomorrow morning for vacation and cannot change travel plans so she will  the prescription and I will notify her if the testing is positive so she can start the vanc taper. Changed prescription to [facility] pharmacy at her preference.    Javi Cardenas  Gastroenterology and Hepatology Fellow, PGY-V

## 2024-05-23 ENCOUNTER — LAB VISIT (OUTPATIENT)
Dept: LAB | Facility: HOSPITAL | Age: 84
End: 2024-05-23
Payer: MEDICARE

## 2024-05-23 DIAGNOSIS — R19.7 DIARRHEA, UNSPECIFIED TYPE: ICD-10-CM

## 2024-05-23 PROCEDURE — 87449 NOS EACH ORGANISM AG IA: CPT | Performed by: STUDENT IN AN ORGANIZED HEALTH CARE EDUCATION/TRAINING PROGRAM

## 2024-05-23 PROCEDURE — 87324 CLOSTRIDIUM AG IA: CPT | Performed by: STUDENT IN AN ORGANIZED HEALTH CARE EDUCATION/TRAINING PROGRAM

## 2024-05-23 PROCEDURE — 83993 ASSAY FOR CALPROTECTIN FECAL: CPT | Performed by: STUDENT IN AN ORGANIZED HEALTH CARE EDUCATION/TRAINING PROGRAM

## 2024-05-23 PROCEDURE — 87493 C DIFF AMPLIFIED PROBE: CPT | Performed by: STUDENT IN AN ORGANIZED HEALTH CARE EDUCATION/TRAINING PROGRAM

## 2024-05-28 LAB — CALPROTECTIN STL-MCNT: ABNORMAL MCG/G

## 2024-10-30 ENCOUNTER — PATIENT OUTREACH (OUTPATIENT)
Dept: ADMINISTRATIVE | Facility: HOSPITAL | Age: 84
End: 2024-10-30
Payer: MEDICARE

## 2024-11-12 ENCOUNTER — OFFICE VISIT (OUTPATIENT)
Dept: INTERNAL MEDICINE | Facility: CLINIC | Age: 84
End: 2024-11-12
Payer: MEDICARE

## 2024-11-12 ENCOUNTER — LAB VISIT (OUTPATIENT)
Dept: LAB | Facility: OTHER | Age: 84
End: 2024-11-12
Payer: MEDICARE

## 2024-11-12 VITALS
HEART RATE: 70 BPM | DIASTOLIC BLOOD PRESSURE: 78 MMHG | OXYGEN SATURATION: 99 % | BODY MASS INDEX: 17.86 KG/M2 | HEIGHT: 69 IN | SYSTOLIC BLOOD PRESSURE: 116 MMHG | WEIGHT: 120.56 LBS

## 2024-11-12 DIAGNOSIS — Z00.00 ANNUAL PHYSICAL EXAM: ICD-10-CM

## 2024-11-12 DIAGNOSIS — R79.9 ABNORMAL FINDING OF BLOOD CHEMISTRY, UNSPECIFIED: ICD-10-CM

## 2024-11-12 DIAGNOSIS — Z00.00 ANNUAL PHYSICAL EXAM: Primary | ICD-10-CM

## 2024-11-12 LAB
ALBUMIN SERPL BCP-MCNC: 3.9 G/DL (ref 3.5–5.2)
ALP SERPL-CCNC: 119 U/L (ref 40–150)
ALT SERPL W/O P-5'-P-CCNC: 37 U/L (ref 10–44)
ANION GAP SERPL CALC-SCNC: 7 MMOL/L (ref 8–16)
AST SERPL-CCNC: 39 U/L (ref 10–40)
BASOPHILS # BLD AUTO: 0.03 K/UL (ref 0–0.2)
BASOPHILS NFR BLD: 0.5 % (ref 0–1.9)
BILIRUB SERPL-MCNC: 1 MG/DL (ref 0.1–1)
BUN SERPL-MCNC: 12 MG/DL (ref 8–23)
CALCIUM SERPL-MCNC: 9.6 MG/DL (ref 8.7–10.5)
CHLORIDE SERPL-SCNC: 110 MMOL/L (ref 95–110)
CO2 SERPL-SCNC: 24 MMOL/L (ref 23–29)
CREAT SERPL-MCNC: 0.8 MG/DL (ref 0.5–1.4)
DIFFERENTIAL METHOD BLD: ABNORMAL
EOSINOPHIL # BLD AUTO: 0.1 K/UL (ref 0–0.5)
EOSINOPHIL NFR BLD: 0.9 % (ref 0–8)
ERYTHROCYTE [DISTWIDTH] IN BLOOD BY AUTOMATED COUNT: 14.6 % (ref 11.5–14.5)
EST. GFR  (NO RACE VARIABLE): >60 ML/MIN/1.73 M^2
GLUCOSE SERPL-MCNC: 86 MG/DL (ref 70–110)
HCT VFR BLD AUTO: 40 % (ref 37–48.5)
HGB BLD-MCNC: 12.7 G/DL (ref 12–16)
IMM GRANULOCYTES # BLD AUTO: 0.02 K/UL (ref 0–0.04)
IMM GRANULOCYTES NFR BLD AUTO: 0.3 % (ref 0–0.5)
LYMPHOCYTES # BLD AUTO: 1.4 K/UL (ref 1–4.8)
LYMPHOCYTES NFR BLD: 24.7 % (ref 18–48)
MCH RBC QN AUTO: 28 PG (ref 27–31)
MCHC RBC AUTO-ENTMCNC: 31.8 G/DL (ref 32–36)
MCV RBC AUTO: 88 FL (ref 82–98)
MONOCYTES # BLD AUTO: 0.3 K/UL (ref 0.3–1)
MONOCYTES NFR BLD: 5.2 % (ref 4–15)
NEUTROPHILS # BLD AUTO: 4 K/UL (ref 1.8–7.7)
NEUTROPHILS NFR BLD: 68.4 % (ref 38–73)
NRBC BLD-RTO: 0 /100 WBC
PLATELET # BLD AUTO: 197 K/UL (ref 150–450)
PMV BLD AUTO: 10.3 FL (ref 9.2–12.9)
POTASSIUM SERPL-SCNC: 4.4 MMOL/L (ref 3.5–5.1)
PROT SERPL-MCNC: 7 G/DL (ref 6–8.4)
RBC # BLD AUTO: 4.53 M/UL (ref 4–5.4)
SODIUM SERPL-SCNC: 141 MMOL/L (ref 136–145)
WBC # BLD AUTO: 5.8 K/UL (ref 3.9–12.7)

## 2024-11-12 PROCEDURE — 99203 OFFICE O/P NEW LOW 30 MIN: CPT | Mod: S$PBB,,,

## 2024-11-12 PROCEDURE — 85025 COMPLETE CBC W/AUTO DIFF WBC: CPT

## 2024-11-12 PROCEDURE — 99213 OFFICE O/P EST LOW 20 MIN: CPT | Mod: PBBFAC

## 2024-11-12 PROCEDURE — 99999 PR PBB SHADOW E&M-EST. PATIENT-LVL III: CPT | Mod: PBBFAC,,,

## 2024-11-12 PROCEDURE — 36415 COLL VENOUS BLD VENIPUNCTURE: CPT

## 2024-11-12 PROCEDURE — 80053 COMPREHEN METABOLIC PANEL: CPT

## 2024-11-12 NOTE — PROGRESS NOTES
"  History & Physical  [facility]      SUBJECTIVE:     History of Present Illness:  Patient is a 84 y.o. female presents to clinic to establish care. Current diagnoses include hx of chronic diarrhea    #Hx of GI issues  She reports a history of GI issues starting approximately five years ago, with severe attacks three years ago and a year and a half ago. During the most recent severe attack, she sought treatment at [facility]. She is currently under the care of Dr. Sol, a gastroenterologist. Her GI symptoms are currently well-controlled, describing them as "fine" without any significant problems. A past incident where a strong antibiotic prescribed by her previous GP caused GI issues required treatment with vancomycin for 6-7 weeks, but she is now doing well.       Last mammogram- NA  Immunizations UTD  Last HgbA1C- N/A  Last lipid panel- N/A    Smoker- Non-smoker  EtOH use- Non-drinker    Review of patient's allergies indicates:   Allergen Reactions    Amoxicillin Diarrhea     "Too strong for me"       Past Medical History:   Diagnosis Date    H/O eye surgery     at age 8 (two done)    History of appendectomy     age 34 or 36    History of removal of ovarian cyst     Hypothyroidism      Past Surgical History:   Procedure Laterality Date     SECTION      age 41    ENDOSCOPIC ULTRASOUND OF UPPER GASTROINTESTINAL TRACT N/A 2023    Procedure: ULTRASOUND, UPPER GI TRACT, ENDOSCOPIC;  Surgeon: Matt Gregorio MD;  Location: [address]);  Service: Endoscopy;  Laterality: N/A;    ERCP N/A 2023    Procedure: ERCP (ENDOSCOPIC RETROGRADE CHOLANGIOPANCREATOGRAPHY);  Surgeon: Matt Gregorio MD;  Location: [address]);  Service: Endoscopy;  Laterality: N/A;    EYE SURGERY      age 8     Family History   Problem Relation Name Age of Onset    Cancer Sister       Social History     Tobacco Use    Smoking status: Former     Current packs/day: 0.00     Types: " "Cigarettes     Quit date: 1968     Years since quittin.2    Smokeless tobacco: Never   Substance Use Topics    Alcohol use: Not Currently     Comment: socially (champagne)    Drug use: No        OBJECTIVE:     Vital Signs (Most Recent)  Vitals:    24 1012   BP: 116/78   BP Location: Left arm   Patient Position: Sitting   Pulse: 70   SpO2: 99%   Weight: 54.7 kg (120 lb 9.5 oz)   Height: 5' 9" (1.753 m)         Physical Exam      ASSESSMENT/PLAN:   84 y.o.female presents to clinic to establish care. Doing well    Reviewed patient's medical history, noting recent GI issues currently resolved  Evaluated need for updated lab work given last CBC and metabolic panel were about 1.5 years ago   Considered age-appropriate cancer screenings, determining no further mammograms or colonoscopies needed at this time     Qiana was seen today for establish care and annual exam.    Diagnoses and all orders for this visit:    Annual physical exam  -     CBC Auto Differential; Future  -     Comprehensive Metabolic Panel; Future    Abnormal finding of blood chemistry, unspecified  -     CBC Auto Differential; Future        Follow-up: 1 year or PRN    This note was generated with the assistance of ambient listening technology. Verbal consent was obtained by the patient and accompanying visitor(s) for the recording of patient appointment to facilitate this note. I attest to having reviewed and edited the generated note for accuracy, though some syntax or spelling errors may persist. Please contact the author of this note for any clarification.      Sergio Benson MD  [facility]        "

## 2025-01-08 ENCOUNTER — TELEPHONE (OUTPATIENT)
Dept: GASTROENTEROLOGY | Facility: CLINIC | Age: 85
End: 2025-01-08
Payer: MEDICARE

## 2025-01-08 NOTE — TELEPHONE ENCOUNTER
----- Message from Ana sent at 1/8/2025  8:51 AM CST -----  Regarding: Rx Refill  Contact: [phone]  Qiana Nettles calling regarding Refills  (message) for #  cholestyramine (QUESTRAN) 4 gram packet        [facility]/pharmacy #[id number] - [place] [id number] [address] [address]  Phone: [phone] Fax: [phone]

## 2025-01-09 ENCOUNTER — TELEPHONE (OUTPATIENT)
Dept: GASTROENTEROLOGY | Facility: CLINIC | Age: 85
End: 2025-01-09
Payer: MEDICARE

## 2025-01-09 NOTE — TELEPHONE ENCOUNTER
----- Message from Phylicia sent at 1/9/2025  3:15 PM CST -----  Type:  RX Refill Request    Who Called: Ms Kiran   Refill or New Rx:refill  RX Name and Strength:cholestyramine (QUESTRAN) 4 gram packet    Preferred Pharmacy with phone number:  [facility]/pharmacy #[id number] - [facility] [address]  [address] [facility] [address]  Phone: [phone] Fax: [phone]     Local or Mail Order:local   Ordering Provider:Dr Cardenas   Would the patient rather a call back or a response via [facility]? Call   Best Call Back Number:[phone]  Additional Information: none

## 2025-01-10 RX ORDER — CHOLESTYRAMINE 4 G/9G
4 POWDER, FOR SUSPENSION ORAL
Qty: 30 PACKET | Refills: 11 | Status: SHIPPED | OUTPATIENT
Start: 2025-01-10 | End: 2026-01-05

## 2025-01-28 ENCOUNTER — OFFICE VISIT (OUTPATIENT)
Dept: DERMATOLOGY | Facility: CLINIC | Age: 85
End: 2025-01-28
Payer: MEDICARE

## 2025-01-28 DIAGNOSIS — L82.1 SK (SEBORRHEIC KERATOSIS): ICD-10-CM

## 2025-01-28 DIAGNOSIS — D18.01 CHERRY ANGIOMA: ICD-10-CM

## 2025-01-28 DIAGNOSIS — Z12.83 SCREENING EXAM FOR SKIN CANCER: Primary | ICD-10-CM

## 2025-01-28 DIAGNOSIS — L57.0 AK (ACTINIC KERATOSIS): ICD-10-CM

## 2025-01-28 DIAGNOSIS — Z86.006 HISTORY OF MELANOMA IN SITU: ICD-10-CM

## 2025-01-28 PROCEDURE — 17000 DESTRUCT PREMALG LESION: CPT | Mod: S$PBB,,, | Performed by: DERMATOLOGY

## 2025-01-28 PROCEDURE — 99203 OFFICE O/P NEW LOW 30 MIN: CPT | Mod: 25,S$PBB,, | Performed by: DERMATOLOGY

## 2025-01-28 PROCEDURE — 17003 DESTRUCT PREMALG LES 2-14: CPT | Mod: S$PBB,,, | Performed by: DERMATOLOGY

## 2025-01-28 PROCEDURE — 99213 OFFICE O/P EST LOW 20 MIN: CPT | Mod: PBBFAC,25 | Performed by: DERMATOLOGY

## 2025-01-28 PROCEDURE — 99999 PR PBB SHADOW E&M-EST. PATIENT-LVL III: CPT | Mod: PBBFAC,,, | Performed by: DERMATOLOGY

## 2025-01-28 PROCEDURE — 17000 DESTRUCT PREMALG LESION: CPT | Mod: PBBFAC | Performed by: DERMATOLOGY

## 2025-01-28 PROCEDURE — 17003 DESTRUCT PREMALG LES 2-14: CPT | Mod: 59,PBBFAC | Performed by: DERMATOLOGY

## 2025-01-28 NOTE — PROGRESS NOTES
Subjective:      Patient ID:  Qiana Nettles is a 84 y.o. female who presents for   Chief Complaint   Patient presents with    Skin Check     TBSE     Qiana Nettles is a 84 y.o. female who presents for: FBSE screening exam for skin cancer.    New patient    The patient has the following lesions of concern:  Location: Forehead  Symptoms: Rough patches  Relieving factors/Previous treatments: Cryo    Pertinent history:  History of blistering sunburns: No  History of tanning bed use: No  Family history of melanoma: No  Personal history of mole removal: No  Personal history of skin cancer: Yes, pt states she has had 2 MM on back and nmsc on face          Pt following in past with outside derm; reports two melanoma in situs on the back and NMSC on forehead.  Last skin check 1 year ago.    Review of Systems   Skin:  Negative for daily sunscreen use, activity-related sunscreen use (Pt avoids sun), dry lips and wears hat.   Hematologic/Lymphatic: Does not bruise/bleed easily.       Objective:   Physical Exam   Constitutional: She appears well-developed and well-nourished. No distress.   Neurological: She is alert and oriented to person, place, and time. She is not disoriented.   Psychiatric: She has a normal mood and affect.   Skin:   Areas Examined (abnormalities noted in diagram):   Scalp / Hair Palpated and Inspected  Head / Face Inspection Performed  Neck Inspection Performed  Chest / Axilla Inspection Performed  Abdomen Inspection Performed  Genitals / Buttocks / Groin Inspection Performed  Back Inspection Performed  RUE Inspected  LUE Inspection Performed  RLE Inspected  LLE Inspection Performed  Nails and Digits Inspection Performed                 Diagram Legend     Erythematous scaling macule/papule c/w actinic keratosis       Vascular papule c/w angioma      Pigmented verrucoid papule/plaque c/w seborrheic keratosis      Yellow umbilicated papule c/w sebaceous hyperplasia      Irregularly shaped tan macule c/w  lentigo     1-2 mm smooth white papules consistent with Milia      Movable subcutaneous cyst with punctum c/w epidermal inclusion cyst      Subcutaneous movable cyst c/w pilar cyst      Firm pink to brown papule c/w dermatofibroma      Pedunculated fleshy papule(s) c/w skin tag(s)      Evenly pigmented macule c/w junctional nevus     Mildly variegated pigmented, slightly irregular-bordered macule c/w mildly atypical nevus      Flesh colored to evenly pigmented papule c/w intradermal nevus       Pink pearly papule/plaque c/w basal cell carcinoma      Erythematous hyperkeratotic cursted plaque c/w SCC      Surgical scar with no sign of skin cancer recurrence      Open and closed comedones      Inflammatory papules and pustules      Verrucoid papule consistent consistent with wart     Erythematous eczematous patches and plaques     Dystrophic onycholytic nail with subungual debris c/w onychomycosis     Umbilicated papule    Erythematous-base heme-crusted tan verrucoid plaque consistent with inflamed seborrheic keratosis     Erythematous Silvery Scaling Plaque c/w Psoriasis     See annotation      Assessment / Plan:        Screening exam for skin cancer  Area of previous melanoma examined. Site well healed with no signs of recurrence.    Total body skin examination performed today including at least 12 points as noted in physical examination. No lesions suspicious for malignancy noted.    Recommend daily sun protection/avoidance, use of at least SPF 30, broad spectrum sunscreen (OTC drug), skin self examinations, and routine physician surveillance to optimize early detection    Records request signed today - will send to prior derm for MM pathology    SK (seborrheic keratosis)  These are benign inherited growths without a malignant potential. Reassurance given to patient. No treatment is necessary.     Cherry angioma  This is a benign vascular lesion. Reassurance given. No treatment required.     AK (actinic  keratosis)  Cryosurgery Procedure Note    Verbal consent from the patient is obtained including, but not limited to, risk of hypopigmentation/hyperpigmentation, scar, recurrence of lesion. The patient is aware of the precancerous quality and need for treatment of these lesions. Liquid nitrogen cryosurgery is applied to the 6 actinic keratoses, as detailed in the physical exam, to produce a freeze injury. The patient is aware that blisters may form and is instructed on wound care with gentle cleansing and use of vaseline ointment to keep moist until healed. The patient is supplied a handout on cryosurgery and is instructed to call if lesions do not completely resolve.    History of melanoma in situ  Yearly skin exams           No follow-ups on file.1 yr

## 2025-03-18 NOTE — H&P
"[facility]  [facility]  History & Physical    Patient Name: Qiana Nettles  MRN: [id number]  Patient Class: OP- Observation  Admission Date: 4/10/2023  Attending Physician: Dionisio Dailey MD   Primary Care Provider: Yusef Anglin MD         Patient information was obtained from patient and ER records.     Subjective:     Principal Problem:Common bile duct dilatation    Chief Complaint:   Chief Complaint   Patient presents with    Abdominal Pain    Diarrhea        HPI: Qiana Nettles is a 82 yo F with PMHx of appendectomy and cholecytectomy (1990s) who presented to the ED for diarrhea. Patient reports diarrhea started at 5am this morning and has progressively worsened throughout the day. Reports ~10 episodes of watery, non bloody diarrhea. Last episode around 3pm. Also admits associated nausea weakness, and intermittent, sharp, 10/10 mid abdominal pain. She took 2 doses of imodium at home. States that abdominal pain is improving and describes it as 2/10 "abdominal soreness". Denies recent antibiotic use or recent hospitalizations. Reports that she has had similar episodes in the past that began after her cholecystectomy, but that she has not had an episode like this in many years. Symptoms usually only lasted one day.  Denies fever, chills, chest pain, palpitations, SOB, cough, abdominal pain, n/v/d, dysuria, headaches and LE swelling.    In ED: Afebrile. Intermittently bradycardic with HR in the high 50s. Hypotensive to 97/53. Given 1L LR. CBC without leukocytosis or anemia. CMP without electrolyte abnormalities, but with T bili 1.4 and transaminits. CT A/P showing prominent dilation of the intrahepatic and extrahepatic biliary ducts and concern for narrowing vs. stricture at the ampula. ED provider discussed with GI. GI recommending MRCP tomorrow and stool studies.       Past Medical History:   Diagnosis Date    H/O eye surgery     at age 8 (two done)    History of appendectomy     age 34 or " 36    History of removal of ovarian cyst     Hypothyroidism        Past Surgical History:   Procedure Laterality Date     SECTION      age 41    EYE SURGERY      age 8       Review of patient's allergies indicates:  No Known Allergies    Current Facility-Administered Medications on File Prior to Encounter   Medication    INV [id number]/Placebo 1x10(11) /mL injection 0.5 mL     Current Outpatient Medications on File Prior to Encounter   Medication Sig    albuterol (PROVENTIL/VENTOLIN HFA) 90 mcg/actuation inhaler Inhale 2 puffs into the lungs every 6 (six) hours as needed.    ascorbic acid, vitamin C, (VITAMIN C) 500 MG tablet Take 500 mg by mouth.    omega-3 fatty acids 1,000 mg Cap Take 2 g by mouth.    tretinoin (RETIN-A) 0.05 % cream APPLY TO AFFECTED AREA AS DIRECTED    valACYclovir (VALTREX) 1000 MG tablet Take 2,000 mg by mouth 2 (two) times daily as needed.    [DISCONTINUED] levothyroxine (SYNTHROID) 50 MCG tablet Take 50 mcg by mouth once daily.     Family History    None       Tobacco Use    Smoking status: Former     Types: Cigarettes     Quit date: 1968     Years since quittin.6    Smokeless tobacco: Never   Substance and Sexual Activity    Alcohol use: Yes     Comment: socially (champagne)    Drug use: No    Sexual activity: Not on file     Review of Systems   Constitutional:  Negative for activity change, chills and fever.   HENT:  Negative for trouble swallowing.    Eyes:  Negative for photophobia and visual disturbance.   Respiratory:  Negative for cough, chest tightness and shortness of breath.    Cardiovascular:  Negative for chest pain, palpitations and leg swelling.   Gastrointestinal:  Positive for abdominal pain, diarrhea and nausea. Negative for constipation and vomiting.   Genitourinary:  Negative for dysuria, frequency and hematuria.   Musculoskeletal:  Negative for back pain, gait problem and neck pain.   Skin:  Negative for rash and wound.   Neurological:   Positive for weakness. Negative for dizziness, syncope, speech difficulty and light-headedness.   Psychiatric/Behavioral:  Negative for agitation and confusion. The patient is not nervous/anxious.    Objective:     Vital Signs (Most Recent):  Temp: 97.4 °F (36.3 °C) (04/10/23 1653)  Pulse: 64 (04/10/23 2100)  Resp: 15 (04/10/23 2100)  BP: (!) 105/54 (04/10/23 2100)  SpO2: 99 % (04/10/23 2100)   Vital Signs (24h Range):  Temp:  [97.4 °F (36.3 °C)] 97.4 °F (36.3 °C)  Pulse:  [58-79] 64  Resp:  [14-20] 15  SpO2:  [97 %-100 %] 99 %  BP: ()/(51-54) 105/54     Weight: 54.4 kg (120 lb)  Body mass index is 18.25 kg/m².    Physical Exam  Vitals and nursing note reviewed.   Constitutional:       General: She is not in acute distress.     Appearance: She is well-developed. She is not ill-appearing.   HENT:      Head: Normocephalic and atraumatic.      Mouth/Throat:      Pharynx: No oropharyngeal exudate.   Eyes:      Conjunctiva/sclera: Conjunctivae normal.      Pupils: Pupils are equal, round, and reactive to light.   Cardiovascular:      Rate and Rhythm: Normal rate and regular rhythm.      Heart sounds: Normal heart sounds.   Pulmonary:      Effort: Pulmonary effort is normal. No respiratory distress.      Breath sounds: Normal breath sounds. No wheezing.   Abdominal:      General: Bowel sounds are normal. There is no distension.      Palpations: Abdomen is soft.      Tenderness: There is abdominal tenderness (mild, generalized but worse in RUQ).   Musculoskeletal:         General: No tenderness. Normal range of motion.      Cervical back: Normal range of motion and neck supple.   Lymphadenopathy:      Cervical: No cervical adenopathy.   Skin:     General: Skin is warm and dry.      Capillary Refill: Capillary refill takes less than 2 seconds.      Findings: No rash.   Neurological:      Mental Status: She is alert and oriented to person, place, and time.      Cranial Nerves: No cranial nerve deficit.      Sensory: No  sensory deficit.      Coordination: Coordination normal.   Psychiatric:         Behavior: Behavior normal.         Thought Content: Thought content normal.         Judgment: Judgment normal.         CRANIAL NERVES     CN III, IV, VI   Pupils are equal, round, and reactive to light.     Significant Labs: All pertinent labs within the past 24 hours have been reviewed.  CBC:   Recent Labs   Lab 04/10/23  1715   WBC 7.68   HGB 12.5   HCT 39.7        CMP:   Recent Labs   Lab 04/10/23  1715      K 3.9      CO2 21*      BUN 17   CREATININE 0.8   CALCIUM 9.3   PROT 7.1   ALBUMIN 4.0   BILITOT 1.4*   ALKPHOS 108   AST 76*   ALT 51*   ANIONGAP 11       Significant Imaging: I have reviewed all pertinent imaging results/findings within the past 24 hours.  Imaging Results               CT Abdomen Pelvis With Contrast (Final result)  Result time 04/10/23 21:35:13      Final result by Jerzy Oglesby MD (04/10/23 21:35:13)                   Impression:      1. Prominent dilation of the intrahepatic and extrahepatic biliary ducts.  The common duct measures 2.3 cm.  Narrowing or stricture at the ampulla is a consideration.  No obstructing lesion is detected.  A prominent postoperative change cannot be excluded.  2. Mild splenomegaly.  3. 4.9 cm right adnexal cystic lesion could represent a right ovarian cyst.  Cystic neoplasm is not excluded.  Gyn follow-up is recommended.  See above comments.  4. Diverticulosis of the sigmoid colon.  5. Nondistention of the stomach.  Mild mucosal thickening/gastritis cannot be excluded.  6. Hepatic cyst.  7. Status post cholecystectomy.  8.  This report was flagged in Epic as abnormal.      Electronically signed by: Jerzy Oglesby  Date:    04/10/2023  Time:    21:35               Narrative:    EXAMINATION:  CT ABDOMEN PELVIS WITH CONTRAST    CLINICAL HISTORY:  Abdominal abscess/infection suspected;    TECHNIQUE:  Low dose axial images, sagittal and coronal reformations  were obtained from the lung bases to the pubic symphysis following the IV administration of 75 mL of Omnipaque 350 .  Oral contrast was not administered.    COMPARISON:  None.    FINDINGS:  Abdomen:    - Lower thorax:    - Lung bases: No infiltrates and no nodules.    - Liver: 1.3 cm hepatic cyst in the anterior left lobe.    - Gallbladder: Status post cholecystectomy.    - Bile Ducts: There is prominent dilation of the intrahepatic and extrahepatic biliary ducts.  The common duct measures approximately 2.3 cm.  Narrowing or stricture at the ampulla is a consideration.  No obstructing lesion is identified.    - Spleen: Spleen is mildly enlarged.    - Kidneys: No stone, mass or hydronephrosis bilaterally.  Extrarenal pelvis configuration on the right.    - Adrenals: Unremarkable.    - Pancreas: No mass or peripancreatic fat stranding.  The pancreatic duct is within normal limits.    - Retroperitoneum:  No significant adenopathy.    - Vascular: No abdominal aortic aneurysm.    - Abdominal wall:  Unremarkable.    Pelvis:    Urinary bladder is within normal limits.    The uterus is present.    Bowel/Mesentery:    No evidence of bowel obstruction.  There is diverticulosis of the sigmoid colon.  No evidence of acute diverticulitis.    There is an enlarged 4.9 cm right adnexal ovoid homogeneous thin wall cystic lesion which could represent a right ovarian cyst.  Cystic neoplasm is not excluded.  Gyn follow-up is recommended.    Bones:  No acute fractures..    Stomach is nondistended.  Mild mucosal thickening/gastritis cannot be excluded.                                    Assessment/Plan:     * Common bile duct dilatation  Elevated T bili  Transaminitis  84 yo F with PMHx of appendectomy and cholecytectomy who presented to the ED for 1 day of diarrhea with associated nausea weakness, and intermittent, sharp, mid abdominal pain.     - Afebrile  - T bili 1.4, AST 76, ALT 51   - Hep C negative  - CT A/P showing prominent  dilation of the intrahepatic and extrahepatic biliary ducts with CBD measuring 2.3 cm. Concern for narrowing vs. stricture at the ampula.  - GI consulted; recommend MRCP and stool studies  - npo at midnight  - daily CMP     Diarrhea  - 1 day history of diarrhea. Denies recent abx use  - BP soft in ED. Given 1L LR; will continue IVFs overnight  - CMP without electrolyte abnormalities  - GI consulted  - stool studies and C.diff pending    Right ovarian cyst  - CT A/P with 4.9 cm right adnexal cystic lesion could represent a right ovarian cyst.  Cystic neoplasm is not excluded.   - will need OP gyn f/u      VTE Risk Mitigation (From admission, onward)         Ordered     enoxaparin injection 40 mg  Daily         04/10/23 2247     IP VTE HIGH RISK PATIENT  Once         04/10/23 2247                     On 04/11/2023, patient should be placed in hospital observation services under my care in collaboration with Dr. Fadi Pat.      Ana Auguste PA-C  Department of Hospital Medicine  [facility]   125

## 2025-07-16 DIAGNOSIS — Z78.0 MENOPAUSE: ICD-10-CM

## 2025-07-28 ENCOUNTER — HOSPITAL ENCOUNTER (OUTPATIENT)
Dept: RADIOLOGY | Facility: OTHER | Age: 85
Discharge: HOME | End: 2025-07-28
Payer: MEDICARE

## 2025-07-28 DIAGNOSIS — Z78.0 MENOPAUSE: ICD-10-CM

## 2025-07-28 PROCEDURE — 77080 DXA BONE DENSITY AXIAL: CPT | Mod: TC

## 2025-07-28 PROCEDURE — 77080 DXA BONE DENSITY AXIAL: CPT | Mod: 26,,, | Performed by: RADIOLOGY

## 2025-07-31 ENCOUNTER — OFFICE VISIT (OUTPATIENT)
Dept: INTERNAL MEDICINE | Facility: CLINIC | Age: 85
End: 2025-07-31
Payer: MEDICARE

## 2025-07-31 VITALS
WEIGHT: 121.69 LBS | SYSTOLIC BLOOD PRESSURE: 100 MMHG | HEART RATE: 77 BPM | BODY MASS INDEX: 18.02 KG/M2 | OXYGEN SATURATION: 97 % | DIASTOLIC BLOOD PRESSURE: 70 MMHG | HEIGHT: 69 IN

## 2025-07-31 DIAGNOSIS — B00.1 COLD SORE: ICD-10-CM

## 2025-07-31 DIAGNOSIS — Z91.89 FRACTURE RISK ASSESSMENT SCORE (FRAX) INDICATING GREATER THAN 3% RISK FOR HIP FRACTURE: Primary | ICD-10-CM

## 2025-07-31 DIAGNOSIS — M85.80 OSTEOPENIA, UNSPECIFIED LOCATION: ICD-10-CM

## 2025-07-31 PROCEDURE — 99213 OFFICE O/P EST LOW 20 MIN: CPT | Mod: PBBFAC

## 2025-07-31 PROCEDURE — 99999 PR PBB SHADOW E&M-EST. PATIENT-LVL III: CPT | Mod: PBBFAC,,,

## 2025-07-31 PROCEDURE — 99214 OFFICE O/P EST MOD 30 MIN: CPT | Mod: S$PBB,,,

## 2025-07-31 RX ORDER — ACYCLOVIR 50 MG/G
OINTMENT TOPICAL
Status: CANCELLED | OUTPATIENT
Start: 2025-07-31

## 2025-07-31 RX ORDER — ALENDRONATE SODIUM 70 MG/1
70 TABLET ORAL
Qty: 4 TABLET | Refills: 11 | Status: SHIPPED | OUTPATIENT
Start: 2025-07-31 | End: 2026-07-31

## 2025-07-31 RX ORDER — ACYCLOVIR 50 MG/G
OINTMENT TOPICAL
Qty: 30 G | Refills: 0 | Status: SHIPPED | OUTPATIENT
Start: 2025-07-31 | End: 2025-08-04